# Patient Record
Sex: MALE | Race: WHITE | Employment: OTHER | URBAN - METROPOLITAN AREA
[De-identification: names, ages, dates, MRNs, and addresses within clinical notes are randomized per-mention and may not be internally consistent; named-entity substitution may affect disease eponyms.]

---

## 2017-07-07 ENCOUNTER — OFFICE VISIT (OUTPATIENT)
Dept: ORTHOPEDIC SURGERY | Age: 67
End: 2017-07-07

## 2017-07-07 VITALS — HEIGHT: 71 IN | BODY MASS INDEX: 32.19 KG/M2 | WEIGHT: 229.94 LBS

## 2017-07-07 DIAGNOSIS — M17.10 LOCALIZED OSTEOARTHROSIS, LOWER LEG: Chronic | ICD-10-CM

## 2017-07-07 DIAGNOSIS — M25.561 RIGHT KNEE PAIN, UNSPECIFIED CHRONICITY: Primary | ICD-10-CM

## 2017-07-07 DIAGNOSIS — M17.11 PRIMARY OSTEOARTHRITIS OF RIGHT KNEE: ICD-10-CM

## 2017-07-07 PROCEDURE — G8419 CALC BMI OUT NRM PARAM NOF/U: HCPCS | Performed by: ORTHOPAEDIC SURGERY

## 2017-07-07 PROCEDURE — 73564 X-RAY EXAM KNEE 4 OR MORE: CPT | Performed by: ORTHOPAEDIC SURGERY

## 2017-07-07 PROCEDURE — 3017F COLORECTAL CA SCREEN DOC REV: CPT | Performed by: ORTHOPAEDIC SURGERY

## 2017-07-07 PROCEDURE — 1123F ACP DISCUSS/DSCN MKR DOCD: CPT | Performed by: ORTHOPAEDIC SURGERY

## 2017-07-07 PROCEDURE — G8427 DOCREV CUR MEDS BY ELIG CLIN: HCPCS | Performed by: ORTHOPAEDIC SURGERY

## 2017-07-07 PROCEDURE — 4040F PNEUMOC VAC/ADMIN/RCVD: CPT | Performed by: ORTHOPAEDIC SURGERY

## 2017-07-07 PROCEDURE — MISCLOD MISC IP SERVICE NONBILLABLE: Performed by: ORTHOPAEDIC SURGERY

## 2017-07-07 PROCEDURE — 20610 DRAIN/INJ JOINT/BURSA W/O US: CPT | Performed by: ORTHOPAEDIC SURGERY

## 2017-07-07 PROCEDURE — 1036F TOBACCO NON-USER: CPT | Performed by: ORTHOPAEDIC SURGERY

## 2017-07-14 ENCOUNTER — OFFICE VISIT (OUTPATIENT)
Dept: ORTHOPEDIC SURGERY | Age: 67
End: 2017-07-14

## 2017-07-14 DIAGNOSIS — M17.11 PRIMARY OSTEOARTHRITIS OF RIGHT KNEE: Primary | Chronic | ICD-10-CM

## 2017-07-14 PROCEDURE — 20610 DRAIN/INJ JOINT/BURSA W/O US: CPT | Performed by: ORTHOPAEDIC SURGERY

## 2017-07-14 PROCEDURE — 1036F TOBACCO NON-USER: CPT | Performed by: ORTHOPAEDIC SURGERY

## 2017-07-19 ENCOUNTER — OFFICE VISIT (OUTPATIENT)
Dept: ORTHOPEDIC SURGERY | Age: 67
End: 2017-07-19

## 2017-07-19 DIAGNOSIS — M25.561 RIGHT KNEE PAIN, UNSPECIFIED CHRONICITY: ICD-10-CM

## 2017-07-19 DIAGNOSIS — M17.11 PRIMARY OSTEOARTHRITIS OF RIGHT KNEE: Primary | Chronic | ICD-10-CM

## 2017-07-19 PROCEDURE — 20610 DRAIN/INJ JOINT/BURSA W/O US: CPT | Performed by: FAMILY MEDICINE

## 2017-07-19 PROCEDURE — 1036F TOBACCO NON-USER: CPT | Performed by: FAMILY MEDICINE

## 2017-10-03 RX ORDER — MELOXICAM 15 MG/1
TABLET ORAL
Qty: 90 TABLET | Refills: 3 | Status: SHIPPED | OUTPATIENT
Start: 2017-10-03 | End: 2019-05-20 | Stop reason: ALTCHOICE

## 2018-04-02 ENCOUNTER — OFFICE VISIT (OUTPATIENT)
Dept: ORTHOPEDIC SURGERY | Age: 68
End: 2018-04-02

## 2018-04-02 VITALS
HEART RATE: 57 BPM | WEIGHT: 229.94 LBS | HEIGHT: 71 IN | SYSTOLIC BLOOD PRESSURE: 156 MMHG | DIASTOLIC BLOOD PRESSURE: 83 MMHG | BODY MASS INDEX: 32.19 KG/M2

## 2018-04-02 DIAGNOSIS — M17.0 PRIMARY OSTEOARTHRITIS OF BOTH KNEES: ICD-10-CM

## 2018-04-02 DIAGNOSIS — M17.11 PRIMARY OSTEOARTHRITIS OF RIGHT KNEE: ICD-10-CM

## 2018-04-02 DIAGNOSIS — M17.12 PRIMARY OSTEOARTHRITIS OF LEFT KNEE: ICD-10-CM

## 2018-04-02 DIAGNOSIS — R52 PAIN: Primary | ICD-10-CM

## 2018-04-02 PROCEDURE — 20610 DRAIN/INJ JOINT/BURSA W/O US: CPT | Performed by: ORTHOPAEDIC SURGERY

## 2018-04-02 RX ORDER — LOSARTAN POTASSIUM AND HYDROCHLOROTHIAZIDE 25; 100 MG/1; MG/1
TABLET ORAL
COMMUNITY
End: 2019-05-20

## 2018-04-09 ENCOUNTER — OFFICE VISIT (OUTPATIENT)
Dept: ORTHOPEDIC SURGERY | Age: 68
End: 2018-04-09

## 2018-04-09 VITALS — WEIGHT: 229.94 LBS | HEIGHT: 71 IN | BODY MASS INDEX: 32.19 KG/M2

## 2018-04-09 DIAGNOSIS — M17.12 PRIMARY OSTEOARTHRITIS OF LEFT KNEE: Chronic | ICD-10-CM

## 2018-04-09 DIAGNOSIS — M17.11 PRIMARY OSTEOARTHRITIS OF RIGHT KNEE: Primary | ICD-10-CM

## 2018-04-09 PROCEDURE — 20610 DRAIN/INJ JOINT/BURSA W/O US: CPT | Performed by: FAMILY MEDICINE

## 2018-04-09 RX ORDER — MELOXICAM 15 MG/1
15 TABLET ORAL DAILY
Qty: 30 TABLET | Refills: 3 | Status: SHIPPED | OUTPATIENT
Start: 2018-04-09 | End: 2018-04-09 | Stop reason: CLARIF

## 2018-04-09 RX ORDER — MELOXICAM 15 MG/1
15 TABLET ORAL DAILY
Qty: 90 TABLET | Refills: 0 | Status: SHIPPED | OUTPATIENT
Start: 2018-04-09 | End: 2019-05-20 | Stop reason: ALTCHOICE

## 2018-04-16 ENCOUNTER — OFFICE VISIT (OUTPATIENT)
Dept: ORTHOPEDIC SURGERY | Age: 68
End: 2018-04-16

## 2018-04-16 DIAGNOSIS — M25.561 RIGHT KNEE PAIN, UNSPECIFIED CHRONICITY: ICD-10-CM

## 2018-04-16 DIAGNOSIS — M17.11 PRIMARY OSTEOARTHRITIS OF RIGHT KNEE: ICD-10-CM

## 2018-04-16 DIAGNOSIS — M17.12 PRIMARY OSTEOARTHRITIS OF LEFT KNEE: Primary | Chronic | ICD-10-CM

## 2018-04-16 PROCEDURE — 20610 DRAIN/INJ JOINT/BURSA W/O US: CPT | Performed by: ORTHOPAEDIC SURGERY

## 2018-09-06 ENCOUNTER — TELEPHONE (OUTPATIENT)
Dept: ORTHOPEDIC SURGERY | Age: 68
End: 2018-09-06

## 2018-11-02 ENCOUNTER — OFFICE VISIT (OUTPATIENT)
Dept: ORTHOPEDIC SURGERY | Age: 68
End: 2018-11-02
Payer: MEDICARE

## 2018-11-02 DIAGNOSIS — M17.11 PRIMARY OSTEOARTHRITIS OF RIGHT KNEE: ICD-10-CM

## 2018-11-02 DIAGNOSIS — M17.12 PRIMARY OSTEOARTHRITIS OF LEFT KNEE: Primary | Chronic | ICD-10-CM

## 2018-11-02 PROCEDURE — 20610 DRAIN/INJ JOINT/BURSA W/O US: CPT | Performed by: ORTHOPAEDIC SURGERY

## 2018-11-02 RX ORDER — MELOXICAM 15 MG/1
15 TABLET ORAL DAILY
Qty: 90 TABLET | Refills: 0 | Status: SHIPPED | OUTPATIENT
Start: 2018-11-02 | End: 2019-04-22 | Stop reason: SDUPTHER

## 2018-11-07 ENCOUNTER — OFFICE VISIT (OUTPATIENT)
Dept: ORTHOPEDIC SURGERY | Age: 68
End: 2018-11-07
Payer: MEDICARE

## 2018-11-07 VITALS — BODY MASS INDEX: 32.19 KG/M2 | WEIGHT: 229.94 LBS | HEIGHT: 71 IN

## 2018-11-07 DIAGNOSIS — M17.11 PRIMARY OSTEOARTHRITIS OF RIGHT KNEE: Primary | ICD-10-CM

## 2018-11-07 DIAGNOSIS — M17.12 PRIMARY OSTEOARTHRITIS OF LEFT KNEE: Chronic | ICD-10-CM

## 2018-11-07 PROCEDURE — 99999 PR OFFICE/OUTPT VISIT,PROCEDURE ONLY: CPT | Performed by: FAMILY MEDICINE

## 2018-11-07 PROCEDURE — 20610 DRAIN/INJ JOINT/BURSA W/O US: CPT | Performed by: FAMILY MEDICINE

## 2018-11-12 ENCOUNTER — OFFICE VISIT (OUTPATIENT)
Dept: ORTHOPEDIC SURGERY | Age: 68
End: 2018-11-12
Payer: MEDICARE

## 2018-11-12 DIAGNOSIS — M17.12 PRIMARY OSTEOARTHRITIS OF LEFT KNEE: Primary | Chronic | ICD-10-CM

## 2018-11-12 DIAGNOSIS — M17.11 PRIMARY OSTEOARTHRITIS OF RIGHT KNEE: ICD-10-CM

## 2018-11-12 PROCEDURE — 20610 DRAIN/INJ JOINT/BURSA W/O US: CPT | Performed by: ORTHOPAEDIC SURGERY

## 2018-11-12 NOTE — PROGRESS NOTES
Product Euflexxa    [facility]#[id number]     Lot# [id number]    Exp 10/8/2019    Site CHAYO KNEE

## 2018-11-12 NOTE — PROGRESS NOTES
The patient returns today for their third Euflexxa injection to the left and right knees for diagnosis of osteoarthritis. . The risks, benefits, and complications of the injections were again discussed in detail with the patient. The risks discussed included but are not limited to infection, skin reactions, hot swollen joints, and anaphylaxis. The patient gave verbal informed consent for the injection. The patient skin was prepped with iodine and sterile 4x4 gauze pad and the left and right knee joints were injected with 2 ml of Euflexxa intra-articularly under sterile conditions  into the supra-lateral pouch. The patient tolerated the injection reasonably well. The patient was given instructions to ice the left and right knee and avoid strenuous activities for 24-48 hours. The patient was instructed to call the office immediately if there is increased pain, redness, warmth, fever, or chills. We will see the patient back in 6 months or as needed for follow up.

## 2019-04-22 DIAGNOSIS — M17.12 PRIMARY OSTEOARTHRITIS OF LEFT KNEE: Chronic | ICD-10-CM

## 2019-04-22 DIAGNOSIS — M17.11 PRIMARY OSTEOARTHRITIS OF RIGHT KNEE: ICD-10-CM

## 2019-04-24 RX ORDER — MELOXICAM 15 MG/1
TABLET ORAL
Qty: 90 TABLET | Refills: 0 | Status: SHIPPED | OUTPATIENT
Start: 2019-04-24 | End: 2019-07-31 | Stop reason: SDUPTHER

## 2019-05-20 ENCOUNTER — OFFICE VISIT (OUTPATIENT)
Dept: ORTHOPEDIC SURGERY | Age: 69
End: 2019-05-20
Payer: MEDICARE

## 2019-05-20 VITALS — BODY MASS INDEX: 32.19 KG/M2 | WEIGHT: 229.94 LBS | HEIGHT: 71 IN

## 2019-05-20 DIAGNOSIS — M17.0 BILATERAL PRIMARY OSTEOARTHRITIS OF KNEE: ICD-10-CM

## 2019-05-20 DIAGNOSIS — M17.11 PRIMARY OSTEOARTHRITIS OF RIGHT KNEE: Primary | ICD-10-CM

## 2019-05-20 DIAGNOSIS — M25.561 RIGHT KNEE PAIN, UNSPECIFIED CHRONICITY: ICD-10-CM

## 2019-05-20 PROCEDURE — 99213 OFFICE O/P EST LOW 20 MIN: CPT | Performed by: FAMILY MEDICINE

## 2019-05-20 PROCEDURE — 1123F ACP DISCUSS/DSCN MKR DOCD: CPT | Performed by: FAMILY MEDICINE

## 2019-05-20 PROCEDURE — G8427 DOCREV CUR MEDS BY ELIG CLIN: HCPCS | Performed by: FAMILY MEDICINE

## 2019-05-20 PROCEDURE — 4040F PNEUMOC VAC/ADMIN/RCVD: CPT | Performed by: FAMILY MEDICINE

## 2019-05-20 PROCEDURE — 1036F TOBACCO NON-USER: CPT | Performed by: FAMILY MEDICINE

## 2019-05-20 PROCEDURE — 3017F COLORECTAL CA SCREEN DOC REV: CPT | Performed by: FAMILY MEDICINE

## 2019-05-20 PROCEDURE — G8417 CALC BMI ABV UP PARAM F/U: HCPCS | Performed by: FAMILY MEDICINE

## 2019-05-20 PROCEDURE — 20610 DRAIN/INJ JOINT/BURSA W/O US: CPT | Performed by: FAMILY MEDICINE

## 2019-05-20 RX ORDER — HYALURONATE SODIUM 10 MG/ML
40 SYRINGE (ML) INTRAARTICULAR ONCE
Status: COMPLETED | OUTPATIENT
Start: 2019-05-20 | End: 2019-05-20

## 2019-05-20 RX ORDER — IRBESARTAN AND HYDROCHLOROTHIAZIDE 300; 12.5 MG/1; MG/1
1 TABLET, FILM COATED ORAL
COMMUNITY

## 2019-05-20 RX ADMIN — Medication 40 MG: at 08:39

## 2019-05-20 NOTE — PROGRESS NOTES
Chief Complaint  Knee Pain (EUFLEXXA #1 CHAYO KNEES)      Initial consultation right greater than left knee pain with known history of osteoarthritis    History of Present Illness:  Peggy Garcia is a 71 y.o. male is a very pleasant retired white male with known history of bilateral knee osteoarthritis who is a patient of Dr. Karis Joyaach was seen both myself and Dr. Becca Lawson in the past who is being seen today for recent worsening of his right knee osteoarthritis. He is to be done well with physical supplementation in the past and over the last few weeks has had increasing pain about his knee. He does continue to golf and there was no recalled history of actual injury or activity. He is very difficult performing his home-based exercises and does take his locks a cam 15 mg daily. He does utilize his brace occasionally on the right. Denies locking catching or instability symptoms. It's more of an achy pain at a 1-2/10 with distance walking or repetitive stair climbing. Denies locking catching or instability symptoms. He is not having night pain. He does have to take care of his wife who does have her own medical problems. He is very opposed to considering knee arthroplasty at this time and treat be told is not having a great deal of functional impairment. He is being seen today for repeat evaluation. Medical History     Patient's medications, allergies, past medical, surgical, social and family histories were reviewed and updated as appropriate. Review of Systems  Pertinent items are noted in HPI  Review of systems reviewed from Patient History Form dated on 5/20/2019 and available in the patient's chart under the Media tab. Vital Signs  There were no vitals filed for this visit. General Exam:     Constitutional: Patient is adequately groomed with no evidence of malnutrition  DTRs: Deep tendon reflexes are intact  Mental Status: The patient is oriented to time, place and person.  The patient's mood and affect are appropriate. Lymphatic: The lymphatic examination bilaterally reveals all areas to be without enlargement or induration. Vascular: Examination reveals no swelling or calf tenderness. Peripheral pulses are palpable and 2+. Neurological: The patient has good coordination. There is no weakness or sensory deficit. Knee Examination  Inspection:  There is no high-grade deformity. Does have some patellofemoral crepitation and trace knee joint effusions. Palpation:  He does have tenderness over the medial and lateral patellofemoral facet. He does have a mildly Positive grind test.  Some pain with medial joint line palpation but no high-grade meniscal clicks. Rang of Motion:  He is stiff with terminal 10° of extension with flexion to about 110 bilaterally. Hamstrings are mildly tight as is his right IT band. Strength:  4+ out of 5 with knee flexion and extension. Special Tests:  Mild pain with patellar grind testing. Mild discomfort with crepitation with medial Scooter's. No high-grade clicks. No high-grade instability. Hamstrings and IT band particularly on the right is tight. No palpable Baker's cyst or evidence of instability. Strength testing appears to relatively benign. Skin: There are no rashes, ulcerations or lesions. Distal neurovascular exam is intact. Gait: Fluid smooth gait    Reflex symmetrically preserved    Additional Comments:     Additional Examinations:  Right Lower Extremity: Examination of the right lower extremity does not show any tenderness, deformity or injury. Range of motion is unremarkable. There is no gross instability. There are no rashes, ulcerations or lesions. Strength and tone are normal.  Left Lower Extremity: Examination of the left lower extremity does not show any tenderness, deformity or injury. Range of motion is unremarkable. There is no gross instability. There are no rashes, ulcerations or lesions.   Strength and tone are normal.  Examination of the bilateral hip reveals intact skin. The patient demonstrates full painless range of motion with regards to flexion, abduction, internal and external rotation. There is not tenderness about the greater trochanter. There is a negative straight leg raise against resistance. Strength is 5/5 thorough out all planes. Diagnostic Test Findings:       Assessment :  #1. Symptomatic right greater than left knee multicompartment osteoarthritis with low-grade synovitis and bilateral knee Euflexxa No. 1    Impression:  Encounter Diagnoses   Name Primary?  Bilateral primary osteoarthritis of knee     Primary osteoarthritis of right knee Yes    Right knee pain, unspecified chronicity        Office Procedures:  Orders Placed This Encounter   Procedures    [place] ARTHROCENTESIS ASPIR&/INJ MAJOR JT/BURSA W/O US       Treatment Plan:  Treatment options were discussed with Eitan Osorio. We did review his plain films previously and his exam findings. He does have underlying knee osteoarthritis and has become a little bit more sore and achy over the last few weeks. After discussion of present cons of viscous supplementation, he did receive his first injection of Euflexxa to his knees bilaterally. This was performed using the standard refill 2 mL syringe. He'll continue with his home exercise program empirically some of his right knee brace as well as his meloxicam 15 mg daily. He will be seen back each of the next 2 weeks to finish up his Euflexxa series. He has done well with these in the past getting at least 6 months of pain relief between series. He will be seen back each the next 2 weeks and will contact us in the interim with questions or concerns. This dictation was performed with a verbal recognition program (DRAGON) and it was checked for errors. It is possible that there are still dictated errors within this office note.  If so, please bring any errors to my attention for an addendum. All efforts were made to ensure that this office note is accurate.

## 2019-05-29 ENCOUNTER — OFFICE VISIT (OUTPATIENT)
Dept: ORTHOPEDIC SURGERY | Age: 69
End: 2019-05-29
Payer: MEDICARE

## 2019-05-29 VITALS — HEIGHT: 71 IN | BODY MASS INDEX: 32.19 KG/M2 | WEIGHT: 229.94 LBS

## 2019-05-29 DIAGNOSIS — M17.0 BILATERAL PRIMARY OSTEOARTHRITIS OF KNEE: Primary | ICD-10-CM

## 2019-05-29 DIAGNOSIS — M25.561 RIGHT KNEE PAIN, UNSPECIFIED CHRONICITY: ICD-10-CM

## 2019-05-29 PROCEDURE — 20610 DRAIN/INJ JOINT/BURSA W/O US: CPT | Performed by: FAMILY MEDICINE

## 2019-05-29 PROCEDURE — 99999 PR OFFICE/OUTPT VISIT,PROCEDURE ONLY: CPT | Performed by: FAMILY MEDICINE

## 2019-05-29 RX ORDER — HYALURONATE SODIUM 10 MG/ML
40 SYRINGE (ML) INTRAARTICULAR ONCE
Status: COMPLETED | OUTPATIENT
Start: 2019-05-29 | End: 2019-05-29

## 2019-05-29 RX ADMIN — Medication 40 MG: at 11:02

## 2019-05-29 NOTE — PROGRESS NOTES
The patient returns today for their 2nd Euflexxa injection to his knees bilaterally for diagnosis of osteoarthritis. . The risks, benefits, and complications of the injections were again discussed in detail with the patient. The risks discussed included but are not limited to infection, skin reactions, hot swollen joints, and anaphylaxis. The patient gave verbal informed consent for the injection. The patient skin was prepped with iodine and sterile 4x4 gauze pad at the bilateral knee joint was injected with 2 ml of Euflexxa intra-articularly under sterile conditions  into the supra-lateral pouch. The patient tolerated the injection reasonably well. The patient was given instructions to ice the bilateral knee and avoid strenuous activities for 24-48 hours. The patient was instructed to call the office immediately if there is increased pain, redness, warmth, fever, or chills. The patient is aware that he can have repeat Visco supplementation in 6 months but he is been getting at least 1-2 years out of his Euflexxa injection series in the past.  He will be seen back in 1 week to finish up his Euflexxa injections bilaterally.   He is clinically doing reasonably well at this time and is eager to back into golfing and a regular basis

## 2019-06-03 ENCOUNTER — OFFICE VISIT (OUTPATIENT)
Dept: ORTHOPEDIC SURGERY | Age: 69
End: 2019-06-03
Payer: MEDICARE

## 2019-06-03 VITALS — BODY MASS INDEX: 32.19 KG/M2 | HEIGHT: 71 IN | WEIGHT: 229.94 LBS

## 2019-06-03 DIAGNOSIS — M17.0 BILATERAL PRIMARY OSTEOARTHRITIS OF KNEE: Primary | ICD-10-CM

## 2019-06-03 PROCEDURE — 20610 DRAIN/INJ JOINT/BURSA W/O US: CPT | Performed by: FAMILY MEDICINE

## 2019-06-03 RX ORDER — HYALURONATE SODIUM 10 MG/ML
40 SYRINGE (ML) INTRAARTICULAR ONCE
Status: COMPLETED | OUTPATIENT
Start: 2019-06-03 | End: 2019-06-03

## 2019-06-03 RX ADMIN — Medication 40 MG: at 09:02

## 2019-06-03 NOTE — PROGRESS NOTES
The patient returns today for their third Euflexxa injection to his knees bilaterally for diagnosis of osteoarthritis. . The risks, benefits, and complications of the injections were again discussed in detail with the patient. The risks discussed included but are not limited to infection, skin reactions, hot swollen joints, and anaphylaxis. The patient gave verbal informed consent for the injection. The patient skin was prepped with iodine and sterile 4x4 gauze pad at the bilateral knee joint was injected with 2 ml of Euflexxa intra-articularly under sterile conditions  into the supra-lateral pouch. The patient tolerated the injection reasonably well. The patient was given instructions to ice the bilateral knee and avoid strenuous activities for 24-48 hours. The patient was instructed to call the office immediately if there is increased pain, redness, warmth, fever, or chills. The patient is aware that he can have repeat Visco supplementation in 6 months but he is been getting at least 1-2 years out of his Euflexxa injection series in the past.  He is aware that he can have repeat Visco supplementation in 6 months. He is clinically doing reasonably well at this time and has been able to get back into golfing on a regular basis. We'll see him back in 6 months.

## 2019-07-31 DIAGNOSIS — M17.11 PRIMARY OSTEOARTHRITIS OF RIGHT KNEE: ICD-10-CM

## 2019-07-31 DIAGNOSIS — M17.12 PRIMARY OSTEOARTHRITIS OF LEFT KNEE: Chronic | ICD-10-CM

## 2019-07-31 RX ORDER — MELOXICAM 15 MG/1
TABLET ORAL
Qty: 90 TABLET | Refills: 0 | Status: SHIPPED | OUTPATIENT
Start: 2019-07-31 | End: 2020-05-18

## 2019-11-05 ENCOUNTER — TELEPHONE (OUTPATIENT)
Dept: ORTHOPEDIC SURGERY | Age: 69
End: 2019-11-05

## 2019-12-04 ENCOUNTER — OFFICE VISIT (OUTPATIENT)
Dept: ORTHOPEDIC SURGERY | Age: 69
End: 2019-12-04
Payer: MEDICARE

## 2019-12-04 VITALS — BODY MASS INDEX: 32.19 KG/M2 | HEIGHT: 71 IN | WEIGHT: 229.94 LBS

## 2019-12-04 DIAGNOSIS — M17.0 BILATERAL PRIMARY OSTEOARTHRITIS OF KNEE: Primary | ICD-10-CM

## 2019-12-04 DIAGNOSIS — G89.29 CHRONIC PAIN OF BOTH KNEES: ICD-10-CM

## 2019-12-04 DIAGNOSIS — M25.561 CHRONIC PAIN OF BOTH KNEES: ICD-10-CM

## 2019-12-04 DIAGNOSIS — M25.562 CHRONIC PAIN OF BOTH KNEES: ICD-10-CM

## 2019-12-04 PROCEDURE — 1036F TOBACCO NON-USER: CPT | Performed by: FAMILY MEDICINE

## 2019-12-04 PROCEDURE — G8427 DOCREV CUR MEDS BY ELIG CLIN: HCPCS | Performed by: FAMILY MEDICINE

## 2019-12-04 PROCEDURE — 3017F COLORECTAL CA SCREEN DOC REV: CPT | Performed by: FAMILY MEDICINE

## 2019-12-04 PROCEDURE — G8417 CALC BMI ABV UP PARAM F/U: HCPCS | Performed by: FAMILY MEDICINE

## 2019-12-04 PROCEDURE — 1123F ACP DISCUSS/DSCN MKR DOCD: CPT | Performed by: FAMILY MEDICINE

## 2019-12-04 PROCEDURE — G8484 FLU IMMUNIZE NO ADMIN: HCPCS | Performed by: FAMILY MEDICINE

## 2019-12-04 PROCEDURE — 99213 OFFICE O/P EST LOW 20 MIN: CPT | Performed by: FAMILY MEDICINE

## 2019-12-04 PROCEDURE — 4040F PNEUMOC VAC/ADMIN/RCVD: CPT | Performed by: FAMILY MEDICINE

## 2019-12-04 RX ORDER — METHYLPREDNISOLONE 4 MG/1
TABLET ORAL
Qty: 21 KIT | Refills: 0 | Status: SHIPPED | OUTPATIENT
Start: 2019-12-04 | End: 2019-12-04 | Stop reason: CLARIF

## 2019-12-04 RX ORDER — METHYLPREDNISOLONE 4 MG/1
TABLET ORAL
Qty: 21 KIT | Refills: 0 | Status: SHIPPED | OUTPATIENT
Start: 2019-12-04 | End: 2020-04-27 | Stop reason: ALTCHOICE

## 2019-12-04 RX ORDER — MELOXICAM 15 MG/1
15 TABLET ORAL DAILY
Qty: 30 TABLET | Refills: 3 | Status: SHIPPED | OUTPATIENT
Start: 2019-12-04 | End: 2019-12-09

## 2019-12-09 ENCOUNTER — OFFICE VISIT (OUTPATIENT)
Dept: ORTHOPEDIC SURGERY | Age: 69
End: 2019-12-09
Payer: MEDICARE

## 2019-12-09 VITALS — BODY MASS INDEX: 30.8 KG/M2 | WEIGHT: 220 LBS | HEIGHT: 71 IN

## 2019-12-09 DIAGNOSIS — M17.11 PRIMARY OSTEOARTHRITIS OF RIGHT KNEE: Primary | ICD-10-CM

## 2019-12-09 DIAGNOSIS — M17.12 PRIMARY OSTEOARTHRITIS OF LEFT KNEE: ICD-10-CM

## 2019-12-09 PROCEDURE — 4040F PNEUMOC VAC/ADMIN/RCVD: CPT | Performed by: ORTHOPAEDIC SURGERY

## 2019-12-09 PROCEDURE — 1123F ACP DISCUSS/DSCN MKR DOCD: CPT | Performed by: ORTHOPAEDIC SURGERY

## 2019-12-09 PROCEDURE — 3017F COLORECTAL CA SCREEN DOC REV: CPT | Performed by: ORTHOPAEDIC SURGERY

## 2019-12-09 PROCEDURE — G8417 CALC BMI ABV UP PARAM F/U: HCPCS | Performed by: ORTHOPAEDIC SURGERY

## 2019-12-09 PROCEDURE — 99213 OFFICE O/P EST LOW 20 MIN: CPT | Performed by: ORTHOPAEDIC SURGERY

## 2019-12-09 PROCEDURE — G8484 FLU IMMUNIZE NO ADMIN: HCPCS | Performed by: ORTHOPAEDIC SURGERY

## 2019-12-09 PROCEDURE — G8427 DOCREV CUR MEDS BY ELIG CLIN: HCPCS | Performed by: ORTHOPAEDIC SURGERY

## 2019-12-09 PROCEDURE — 1036F TOBACCO NON-USER: CPT | Performed by: ORTHOPAEDIC SURGERY

## 2019-12-09 PROCEDURE — L3170 FOOT PLAS HEEL STABI PRE OTS: HCPCS | Performed by: ORTHOPAEDIC SURGERY

## 2019-12-09 RX ORDER — HYALURONATE SODIUM 10 MG/ML
40 SYRINGE (ML) INTRAARTICULAR ONCE
Status: COMPLETED | OUTPATIENT
Start: 2019-12-09 | End: 2019-12-09

## 2019-12-09 RX ADMIN — Medication 40 MG: at 11:03

## 2019-12-18 ENCOUNTER — OFFICE VISIT (OUTPATIENT)
Dept: ORTHOPEDIC SURGERY | Age: 69
End: 2019-12-18
Payer: MEDICARE

## 2019-12-18 VITALS — HEIGHT: 71 IN | BODY MASS INDEX: 30.8 KG/M2 | WEIGHT: 220.02 LBS

## 2019-12-18 DIAGNOSIS — M17.11 PRIMARY OSTEOARTHRITIS OF RIGHT KNEE: Primary | ICD-10-CM

## 2019-12-18 DIAGNOSIS — M25.562 CHRONIC PAIN OF BOTH KNEES: ICD-10-CM

## 2019-12-18 DIAGNOSIS — G89.29 CHRONIC PAIN OF BOTH KNEES: ICD-10-CM

## 2019-12-18 DIAGNOSIS — M25.561 CHRONIC PAIN OF BOTH KNEES: ICD-10-CM

## 2019-12-18 DIAGNOSIS — M17.12 PRIMARY OSTEOARTHRITIS OF LEFT KNEE: ICD-10-CM

## 2019-12-18 PROCEDURE — 20610 DRAIN/INJ JOINT/BURSA W/O US: CPT | Performed by: FAMILY MEDICINE

## 2019-12-18 RX ORDER — HYALURONATE SODIUM 10 MG/ML
40 SYRINGE (ML) INTRAARTICULAR ONCE
Status: COMPLETED | OUTPATIENT
Start: 2019-12-18 | End: 2019-12-18

## 2019-12-18 RX ADMIN — Medication 40 MG: at 08:31

## 2019-12-23 ENCOUNTER — OFFICE VISIT (OUTPATIENT)
Dept: ORTHOPEDIC SURGERY | Age: 69
End: 2019-12-23
Payer: MEDICARE

## 2019-12-23 VITALS — HEIGHT: 71 IN | WEIGHT: 220.02 LBS | BODY MASS INDEX: 30.8 KG/M2

## 2019-12-23 DIAGNOSIS — M17.0 BILATERAL PRIMARY OSTEOARTHRITIS OF KNEE: Primary | ICD-10-CM

## 2019-12-23 DIAGNOSIS — M25.562 CHRONIC PAIN OF BOTH KNEES: ICD-10-CM

## 2019-12-23 DIAGNOSIS — G89.29 CHRONIC PAIN OF BOTH KNEES: ICD-10-CM

## 2019-12-23 DIAGNOSIS — M25.561 CHRONIC PAIN OF BOTH KNEES: ICD-10-CM

## 2019-12-23 PROCEDURE — 20610 DRAIN/INJ JOINT/BURSA W/O US: CPT | Performed by: FAMILY MEDICINE

## 2019-12-23 RX ORDER — HYALURONATE SODIUM 10 MG/ML
40 SYRINGE (ML) INTRAARTICULAR ONCE
Status: COMPLETED | OUTPATIENT
Start: 2019-12-23 | End: 2019-12-23

## 2019-12-23 RX ADMIN — Medication 40 MG: at 11:20

## 2020-03-02 RX ORDER — MELOXICAM 15 MG/1
15 TABLET ORAL DAILY
Qty: 90 TABLET | Refills: 1 | Status: SHIPPED | OUTPATIENT
Start: 2020-03-02 | End: 2020-04-27 | Stop reason: SDUPTHER

## 2020-04-27 ENCOUNTER — OFFICE VISIT (OUTPATIENT)
Dept: ORTHOPEDIC SURGERY | Age: 70
End: 2020-04-27
Payer: MEDICARE

## 2020-04-27 VITALS — WEIGHT: 220.02 LBS | BODY MASS INDEX: 30.8 KG/M2 | HEIGHT: 71 IN

## 2020-04-27 PROCEDURE — 20610 DRAIN/INJ JOINT/BURSA W/O US: CPT | Performed by: FAMILY MEDICINE

## 2020-04-27 PROCEDURE — G8427 DOCREV CUR MEDS BY ELIG CLIN: HCPCS | Performed by: FAMILY MEDICINE

## 2020-04-27 PROCEDURE — 99214 OFFICE O/P EST MOD 30 MIN: CPT | Performed by: FAMILY MEDICINE

## 2020-04-27 PROCEDURE — 1123F ACP DISCUSS/DSCN MKR DOCD: CPT | Performed by: FAMILY MEDICINE

## 2020-04-27 PROCEDURE — G8417 CALC BMI ABV UP PARAM F/U: HCPCS | Performed by: FAMILY MEDICINE

## 2020-04-27 PROCEDURE — 1036F TOBACCO NON-USER: CPT | Performed by: FAMILY MEDICINE

## 2020-04-27 PROCEDURE — 4040F PNEUMOC VAC/ADMIN/RCVD: CPT | Performed by: FAMILY MEDICINE

## 2020-04-27 PROCEDURE — 3017F COLORECTAL CA SCREEN DOC REV: CPT | Performed by: FAMILY MEDICINE

## 2020-04-27 RX ORDER — BETAMETHASONE SODIUM PHOSPHATE AND BETAMETHASONE ACETATE 3; 3 MG/ML; MG/ML
12 INJECTION, SUSPENSION INTRA-ARTICULAR; INTRALESIONAL; INTRAMUSCULAR; SOFT TISSUE ONCE
Status: COMPLETED | OUTPATIENT
Start: 2020-04-27 | End: 2020-04-27

## 2020-04-27 RX ORDER — AMLODIPINE BESYLATE AND ATORVASTATIN CALCIUM 10; 10 MG/1; MG/1
1 TABLET, FILM COATED ORAL DAILY
COMMUNITY

## 2020-04-27 RX ORDER — LIDOCAINE HYDROCHLORIDE 10 MG/ML
1 INJECTION, SOLUTION INFILTRATION; PERINEURAL ONCE
Status: COMPLETED | OUTPATIENT
Start: 2020-04-27 | End: 2020-04-27

## 2020-04-27 RX ORDER — BUPIVACAINE HYDROCHLORIDE 2.5 MG/ML
2 INJECTION, SOLUTION INFILTRATION; PERINEURAL ONCE
Status: COMPLETED | OUTPATIENT
Start: 2020-04-27 | End: 2020-04-27

## 2020-04-27 RX ADMIN — BETAMETHASONE SODIUM PHOSPHATE AND BETAMETHASONE ACETATE 12 MG: 3; 3 INJECTION, SUSPENSION INTRA-ARTICULAR; INTRALESIONAL; INTRAMUSCULAR; SOFT TISSUE at 09:04

## 2020-04-27 RX ADMIN — LIDOCAINE HYDROCHLORIDE 1 ML: 10 INJECTION, SOLUTION INFILTRATION; PERINEURAL at 09:05

## 2020-04-27 RX ADMIN — BUPIVACAINE HYDROCHLORIDE 5 MG: 2.5 INJECTION, SOLUTION INFILTRATION; PERINEURAL at 09:05

## 2020-05-18 RX ORDER — MELOXICAM 15 MG/1
TABLET ORAL
Qty: 30 TABLET | Refills: 0 | Status: SHIPPED | OUTPATIENT
Start: 2020-05-18 | End: 2020-07-20

## 2020-06-29 ENCOUNTER — OFFICE VISIT (OUTPATIENT)
Dept: ORTHOPEDIC SURGERY | Age: 70
End: 2020-06-29
Payer: MEDICARE

## 2020-06-29 VITALS — BODY MASS INDEX: 31.5 KG/M2 | HEIGHT: 70 IN | WEIGHT: 220 LBS

## 2020-06-29 PROCEDURE — G8427 DOCREV CUR MEDS BY ELIG CLIN: HCPCS | Performed by: FAMILY MEDICINE

## 2020-06-29 PROCEDURE — 3017F COLORECTAL CA SCREEN DOC REV: CPT | Performed by: FAMILY MEDICINE

## 2020-06-29 PROCEDURE — 20610 DRAIN/INJ JOINT/BURSA W/O US: CPT | Performed by: FAMILY MEDICINE

## 2020-06-29 PROCEDURE — 1036F TOBACCO NON-USER: CPT | Performed by: FAMILY MEDICINE

## 2020-06-29 PROCEDURE — 4040F PNEUMOC VAC/ADMIN/RCVD: CPT | Performed by: FAMILY MEDICINE

## 2020-06-29 PROCEDURE — 1123F ACP DISCUSS/DSCN MKR DOCD: CPT | Performed by: FAMILY MEDICINE

## 2020-06-29 PROCEDURE — G8417 CALC BMI ABV UP PARAM F/U: HCPCS | Performed by: FAMILY MEDICINE

## 2020-06-29 RX ORDER — ASPIRIN 81 MG/1
81 TABLET ORAL DAILY
COMMUNITY

## 2020-06-29 RX ORDER — METOPROLOL SUCCINATE AND HYDROCHLOROTHIAZIDE 12.5; 1 MG/1; MG/1
TABLET ORAL
COMMUNITY

## 2020-06-29 RX ORDER — HYALURONATE SODIUM 10 MG/ML
40 SYRINGE (ML) INTRAARTICULAR ONCE
Status: COMPLETED | OUTPATIENT
Start: 2020-06-29 | End: 2020-06-29

## 2020-06-29 RX ADMIN — Medication 40 MG: at 09:00

## 2020-06-29 NOTE — PROGRESS NOTES
is helped him out substantially in the past.  He does not want to consider knee arthroplasty during golf season but may consider this during the winter. Denies locking catching or true instability symptoms. He does utilize his knee braces consistently with golfing as tolerated his meloxicam.  Is locking catching or instability symptoms. Denies rest or night pain. Less pain with stair climbing. He does golf several times per week and but usually uses a cart. Attest: I have reviewed and attest the documentation of the HPI documented by my . I will make any changes if necessary. Enc Date: 6/29/2020  Time: 9:13 AM  Provider: Timbo Dunham MD        Social History     Tobacco Use    Smoking status: Never Smoker    Smokeless tobacco: Never Used   Substance Use Topics    Alcohol use: Not on file    Drug use: Not on file        Review of Systems  Pertinent items are noted in HPI  Review of systems reviewed from Patient History Form dated on 12/4/2019 and available in the patient's chart under the Media tab. Vital Signs     Ht 5' 10\" (1.778 m)   Wt 220 lb (99.8 kg)   BMI 31.57 kg/m²       General Exam:   Constitutional: Patient is adequately groomed with no evidence of malnutrition  DTRs: Deep tendon reflexes are intact  Mental Status: The patient is oriented to time, place and person. The patient's mood and affect are appropriate. Lymphatic: The lymphatic examination bilaterally reveals all areas to be without enlargement or induration. Vascular: Examination reveals no swelling or calf tenderness. Peripheral pulses are palpable and 2+. Neurological: The patient has good coordination. There is no weakness or sensory deficit. Knee Examination  Inspection:  There is no high-grade deformity. Does have some patellofemoral crepitation and trace knee joint effusions.     Palpation:  He does have more mild tenderness over the medial and lateral patellofemoral facet.   He does have a mildly Positive grind test.  Some pain with medial joint line palpation but no high-grade meniscal clicks.     Rang of Motion:  He is stiff with terminal 10° of extension with flexion to about 110 bilaterally. Hamstrings are mildly tight as is his right IT band.     Strength:  4+ out of 5 with knee flexion and extension.     Special Tests:  Mild pain with patellar grind testing. Mild discomfort with crepitation with medial Scooter's. No high-grade clicks. No high-grade instability. Hamstrings and IT band particularly on the right is tight. No palpable Baker's cyst or evidence of instability. Strength testing appears to relatively benign.     Skin: There are no rashes, ulcerations or lesions. Distal neurovascular exam is intact.     Gait: Fluid smooth gait     Reflex symmetrically preserved     Additional Comments:      Additional Examinations:  Right Lower Extremity: Examination of the right lower extremity does not show any tenderness, deformity or injury. Range of motion is unremarkable. There is no gross instability. There are no rashes, ulcerations or lesions. Strength and tone are normal.  Left Lower Extremity: Examination of the left lower extremity does not show any tenderness, deformity or injury. Range of motion is unremarkable. There is no gross instability. There are no rashes, ulcerations or lesions. Strength and tone are normal.  Examination of the bilateral hip reveals intact skin. The patient demonstrates full painless range of motion with regards to flexion, abduction, internal and external rotation. There is not tenderness about the greater trochanter. There is a negative straight leg raise against resistance.   Strength is 5/5 thorough out all planes.        Diagnostic Test Findings: Updated bilateral knee AP and PA weightbearing sunrise and lateral films were reviewed from 12/4/2019 and does show advanced bilateral knee medial compartment osteoarthritis with effective were made to ensure that this office note is accurate.

## 2020-07-06 ENCOUNTER — OFFICE VISIT (OUTPATIENT)
Dept: ORTHOPEDIC SURGERY | Age: 70
End: 2020-07-06
Payer: MEDICARE

## 2020-07-06 VITALS — HEIGHT: 70 IN | BODY MASS INDEX: 31.5 KG/M2 | WEIGHT: 220 LBS

## 2020-07-06 PROCEDURE — 20610 DRAIN/INJ JOINT/BURSA W/O US: CPT | Performed by: FAMILY MEDICINE

## 2020-07-06 RX ORDER — HYALURONATE SODIUM 10 MG/ML
40 SYRINGE (ML) INTRAARTICULAR ONCE
Status: COMPLETED | OUTPATIENT
Start: 2020-07-06 | End: 2020-07-06

## 2020-07-06 RX ADMIN — Medication 40 MG: at 08:56

## 2020-07-06 NOTE — PROGRESS NOTES
Spencer Concepcion is a 71 y.o. male who is a very pleasant retired white male who does golf on a near daily basis and is a patient of Dr. Devendra Mckeon who had been followed over the last several years by myself and Dr. 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 Did have consultation with Dr. Estuardo oRe regarding options down the road for his knee. The bottom line is he is still functioning and pain is tolerable. He did learn that he may be a candidate for partial replacement down the road if conservative treatment is failing him. They did give him his first Euflexxa injection during his visit on 12/9/2019 and is here today for his second injection. The patient returns today for their 2nd Euflexxa injection to his knees bilaterally for diagnosis of osteoarthritis. . The risks, benefits, and complications of the injections were again discussed in detail with the patient. The risks discussed included but are not limited to infection, skin reactions, hot swollen joints, and anaphylaxis. The patient gave verbal informed consent for the injection. The patient skin was prepped with iodine and sterile 4x4 gauze pad at the bilateral knee joint was injected with 2 ml of Euflexxa intra-articularly under sterile conditions  into the supra-lateral pouch. The patient tolerated the injection reasonably well. The patient was given instructions to ice the bilateral knee and avoid strenuous activities for 24-48 hours. The patient was instructed to call the office immediately if there is increased pain, redness, warmth, fever, or chills. The patient is aware that he can have repeat Visco supplementation in 6 months but he is been getting at least 1-2 years out of his Euflexxa injection series in the past.  He will be seen back in 1 week to finish up his Euflexxa injections bilaterally.   He is clinically doing reasonably well at this time and is eager to back into [employer] and a regular basis    Continue with his home-based exercise program use of his knee braces and his meloxicam 15 mg daily. Will be seen back next week to continue with his Euflexxa injection.

## 2020-07-13 ENCOUNTER — OFFICE VISIT (OUTPATIENT)
Dept: ORTHOPEDIC SURGERY | Age: 70
End: 2020-07-13
Payer: MEDICARE

## 2020-07-13 VITALS — WEIGHT: 220 LBS | HEIGHT: 70 IN | BODY MASS INDEX: 31.5 KG/M2

## 2020-07-13 PROCEDURE — 20610 DRAIN/INJ JOINT/BURSA W/O US: CPT | Performed by: FAMILY MEDICINE

## 2020-07-13 RX ORDER — HYALURONATE SODIUM 10 MG/ML
40 SYRINGE (ML) INTRAARTICULAR ONCE
Status: COMPLETED | OUTPATIENT
Start: 2020-07-13 | End: 2020-07-13

## 2020-07-13 RX ADMIN — Medication 40 MG: at 09:00

## 2020-07-13 NOTE — PROGRESS NOTES
David Cheng is a 79 y.o. male who is a very pleasant retired white male who does golf on a near daily basis and is a patient of Dr. Wm Jerez who had been followed over the last several years by myself and Dr. 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 Did have consultation with Dr. Alvaro Hayden regarding options down the road for his knee. The bottom line is he is still functioning and pain is tolerable. He did learn that he may be a candidate for partial replacement down the road if conservative treatment is failing him. He is here today for his third injection. The patient returns today for their third Euflexxa injection to his knees bilaterally for diagnosis of osteoarthritis. . The risks, benefits, and complications of the injections were again discussed in detail with the patient. The risks discussed included but are not limited to infection, skin reactions, hot swollen joints, and anaphylaxis. The patient gave verbal informed consent for the injection. The patient skin was prepped with iodine and sterile 4x4 gauze pad at the bilateral knee joint was injected with 2 ml of Euflexxa intra-articularly under sterile conditions  into the supra-lateral pouch. The patient tolerated the injection reasonably well. The patient was given instructions to ice the bilateral knee and avoid strenuous activities for 24-48 hours. The patient was instructed to call the office immediately if there is increased pain, redness, warmth, fever, or chills. The patient is aware that he can have repeat Visco supplementation in 6 months but he is been getting at least 6-12 out of his Euflexxa injection series in the past.  He will call us in the interim with questions or concerns.    He is clinically doing reasonably well at this time and is eager to back into golfing and a regular

## 2020-07-20 RX ORDER — MELOXICAM 15 MG/1
TABLET ORAL
Qty: 90 TABLET | Refills: 0 | Status: SHIPPED | OUTPATIENT
Start: 2020-07-20 | End: 2020-10-14

## 2020-10-14 RX ORDER — MELOXICAM 15 MG/1
TABLET ORAL
Qty: 90 TABLET | Refills: 0 | Status: SHIPPED | OUTPATIENT
Start: 2020-10-14

## 2021-01-18 ENCOUNTER — OFFICE VISIT (OUTPATIENT)
Dept: ORTHOPEDIC SURGERY | Age: 71
End: 2021-01-18
Payer: MEDICARE

## 2021-01-18 VITALS — BODY MASS INDEX: 31.5 KG/M2 | WEIGHT: 220 LBS | HEIGHT: 70 IN

## 2021-01-18 DIAGNOSIS — M17.11 PRIMARY OSTEOARTHRITIS OF RIGHT KNEE: ICD-10-CM

## 2021-01-18 DIAGNOSIS — M17.12 PRIMARY OSTEOARTHRITIS OF LEFT KNEE: Primary | ICD-10-CM

## 2021-01-18 DIAGNOSIS — M25.561 CHRONIC PAIN OF BOTH KNEES: ICD-10-CM

## 2021-01-18 DIAGNOSIS — G89.29 CHRONIC PAIN OF BOTH KNEES: ICD-10-CM

## 2021-01-18 DIAGNOSIS — M25.562 CHRONIC PAIN OF BOTH KNEES: ICD-10-CM

## 2021-01-18 PROCEDURE — 20610 DRAIN/INJ JOINT/BURSA W/O US: CPT | Performed by: FAMILY MEDICINE

## 2021-01-18 PROCEDURE — 4040F PNEUMOC VAC/ADMIN/RCVD: CPT | Performed by: FAMILY MEDICINE

## 2021-01-18 PROCEDURE — 3017F COLORECTAL CA SCREEN DOC REV: CPT | Performed by: FAMILY MEDICINE

## 2021-01-18 PROCEDURE — G8427 DOCREV CUR MEDS BY ELIG CLIN: HCPCS | Performed by: FAMILY MEDICINE

## 2021-01-18 PROCEDURE — 99214 OFFICE O/P EST MOD 30 MIN: CPT | Performed by: FAMILY MEDICINE

## 2021-01-18 PROCEDURE — G8417 CALC BMI ABV UP PARAM F/U: HCPCS | Performed by: FAMILY MEDICINE

## 2021-01-18 PROCEDURE — 1036F TOBACCO NON-USER: CPT | Performed by: FAMILY MEDICINE

## 2021-01-18 PROCEDURE — 1123F ACP DISCUSS/DSCN MKR DOCD: CPT | Performed by: FAMILY MEDICINE

## 2021-01-18 PROCEDURE — G8484 FLU IMMUNIZE NO ADMIN: HCPCS | Performed by: FAMILY MEDICINE

## 2021-01-18 RX ORDER — HYALURONATE SODIUM 10 MG/ML
40 SYRINGE (ML) INTRAARTICULAR ONCE
Status: COMPLETED | OUTPATIENT
Start: 2021-01-18 | End: 2021-01-18

## 2021-01-18 RX ADMIN — Medication 40 MG: at 11:05

## 2021-01-18 NOTE — PROGRESS NOTES
Chief Complaint  Knee Pain (fu jose alberto knee, wanting to start Euflexxa today)      Rigoberto Castellanos is a 79 y.o. male who is a very pleasant retired white male who does golf on a near daily basis and is a patient of Dr. Jaime Sullivan who had been followed over the last several years by myself and Dr. Bharti Melvin for underlying significant medial compartment osteoarthritis with patellofemoral arthropathy who is being seen back today for recurrence of his knee pain. He last completed a round of bilateral knee Euflexxa on 12/23/2019 which did help him substantially up until early April 2020 there is no interim history of injury but has been golfing 4 to 5 days/week but it is not raining. Ulus Reusing He does relate that while this is helped him in the past his last round of Visco supplementation is not nearly as effective as previous rounds have been. He is continue with his exercise program and does takes his his meloxicam fairly consistently. He does use knee sleeves which he got over-the-counter with golfing but typically when he golfs will come home and have to ice his knees. He is able to walk and perform most of his normal activities but typically will be sore post activity. Distance walking walking on uneven surfaces and stairclimbing can produce pain anywhere from a 2-5 out of 10. It is more achy in nature. He has been strongly considering the possibility of knee arthroplasty but is not having substantial night pain. His concern is that he is not getting \"any younger\" and does wish to continue with his normal golfing patterns. Denies locking catching or instability symptoms. He does have crepitation and popping. He does have episodic swelling. No true instability symptoms noted. He was last seen in the office on 4/27/2020 and was having significant discomfort in his knees bilaterally at that point.   With treatment he presents back today stating that he is doing much better would like to consider Visco supplementation once again which is helped him out substantially in the past.  He does not want to consider knee arthroplasty during golf season but may consider this during the winter. Denies locking catching or true instability symptoms. He does utilize his knee braces consistently with golfing as tolerated his meloxicam.  Is locking catching or instability symptoms. Denies rest or night pain. Less pain with stair climbing. He does golf several times per week and but usually uses a cart. Oren Tolliver was last seen in the office on 7/13/2020 and was continued on conservative treatment for his bilateral knee osteoarthritis. We completed Euflexxa to his knees bilaterally on 7/13/2020 and was continued on conservative treatment with his home-based exercise program and use of his knee brace. He did very well with viscosupplementation and continues to golf even in the winter several times per week. He has tolerated his meloxicam and does believe he had gotten benefit from receiving his viscosupplementation. He would like to try this again as he is hopeful to put off surgical intervention as much as possible. At most with his pain is only 1-2 out of 10 with distance walking and stair climbing. No night pain mechanical or instability symptoms noted. Pain Assessment  Location of Pain: Knee  Location Modifiers: Left, Right  Severity of Pain: 0     Attest: I have reviewed and attest the documentation of the HPI documented by my . I will make any changes if necessary. Enc Date: 1/18/2021  Time: 12:42 PM  Provider: José Luis Maldonado MD        Social History     Tobacco Use    Smoking status: Never Smoker    Smokeless tobacco: Never Used   Substance Use Topics    Alcohol use: Not on file    Drug use: Not on file        Review of Systems  Pertinent items are noted in HPI  Review of systems reviewed from Patient History Form dated on 12/4/2019 and available in the patient's chart under the Media tab.        Vital Signs     Ht 5' 10\" (1.778 m)   Wt 220 lb (99.8 kg)   BMI 31.57 kg/m²       General Exam:   Constitutional: Patient is adequately groomed with no evidence of malnutrition  DTRs: Deep tendon reflexes are intact  Mental Status: The patient is oriented to time, place and person. The patient's mood and affect are appropriate. Lymphatic: The lymphatic examination bilaterally reveals all areas to be without enlargement or induration. Vascular: Examination reveals no swelling or calf tenderness. Peripheral pulses are palpable and 2+. Neurological: The patient has good coordination. There is no weakness or sensory deficit. Knee Examination  Inspection:  There is no high-grade deformity. Does have some patellofemoral crepitation and trace knee joint effusions.     Palpation:  He does have more mild tenderness over the medial and lateral patellofemoral facet. He does have a mildly Positive grind test.  Some pain with medial joint line palpation but no high-grade meniscal clicks.     Rang of Motion:  He is stiff with terminal 10° of extension with flexion to about 110 bilaterally. Hamstrings are mildly tight as is his right IT band.     Strength:  4+ out of 5 with knee flexion and extension.     Special Tests:  Mild pain with patellar grind testing. Mild discomfort with crepitation with medial Scooter's. No high-grade clicks. No high-grade instability. Hamstrings and IT band particularly on the right is tight. No palpable Baker's cyst or evidence of instability. Strength testing appears to relatively benign.     Skin: There are no rashes, ulcerations or lesions. Distal neurovascular exam is intact.     Gait: Fluid smooth gait     Reflex symmetrically preserved     Additional Comments:      Additional Examinations:  Right Lower Extremity: Examination of the right lower extremity does not show any tenderness, deformity or injury. Range of motion is unremarkable. There is no gross instability.   There are no rashes, ulcerations or lesions. Strength and tone are normal.  Left Lower Extremity: Examination of the left lower extremity does not show any tenderness, deformity or injury. Range of motion is unremarkable. There is no gross instability. There are no rashes, ulcerations or lesions. Strength and tone are normal.  Examination of the bilateral hip reveals intact skin. The patient demonstrates full painless range of motion with regards to flexion, abduction, internal and external rotation. There is not tenderness about the greater trochanter. There is a negative straight leg raise against resistance. Strength is 5/5 thorough out all planes.        Diagnostic Test Findings: Updated bilateral knee AP and PA weightbearing sunrise and lateral films were reviewed from 12/4/2019 and does show advanced bilateral knee medial compartment osteoarthritis with effective bone-on-bone changes to the medial compartments. At least moderate right greater than left patellofemoral arthropathy with spurring was noted. Assessment & Plan:    Encounter Diagnoses   Name Primary?  Primary osteoarthritis of left knee Yes    Primary osteoarthritis of right knee     Chronic pain of both knees        Orders Placed This Encounter   Procedures    [place] ARTHROCENTESIS ASPIR&/INJ MAJOR JT/BURSA W/O US         Treatment Plan:  Treatment options were discussed with Rere Guadalupe. We did review his updated plain films and his exam findings. He does have underlying significant medial compartment bilateral knee osteoarthritis and clinically at this time is doing reasonably well. Most of the time his pain is 0 out of 10 but at maximum he can only be a 1-2 out of 10 but he has been able to golf several days per week. After discussion of pros and cons of Visco supplementation, he did receive his first injection of Euflexxa to his knees bilaterally. This was performed using the standard prefilled 2 cc syringe.   He will be seen back each the next 2 weeks to continue with his Euflexxa series which is helped him substantially in the past.  He did have surgical consultation in December 2019 with Dr. Stefanie Lorenzo who recommended that he continues with conservative treatment given his lack of high-grade limitations currently and lack of night pain and reasonable functionality. He may continue with his knee braces and his home-based exercise program as well as his meloxicam 15 mg daily. He will be seen back each in the next 2 weeks to continue with his Euflexxa series to his knees bilaterally. He will contact us in the interim with questions or concerns.          This dictation was performed with a verbal recognition program (DRAGON) and it was checked for errors. It is possible that there are still dictated errors within this office note. If so, please bring any errors to my attention for an addendum. All efforts were made to ensure that this office note is accurate.

## 2021-01-25 ENCOUNTER — OFFICE VISIT (OUTPATIENT)
Dept: ORTHOPEDIC SURGERY | Age: 71
End: 2021-01-25
Payer: MEDICARE

## 2021-01-25 VITALS — HEIGHT: 70 IN | WEIGHT: 220 LBS | BODY MASS INDEX: 31.5 KG/M2

## 2021-01-25 DIAGNOSIS — M25.562 CHRONIC PAIN OF BOTH KNEES: ICD-10-CM

## 2021-01-25 DIAGNOSIS — M25.561 CHRONIC PAIN OF BOTH KNEES: ICD-10-CM

## 2021-01-25 DIAGNOSIS — M17.11 PRIMARY OSTEOARTHRITIS OF RIGHT KNEE: ICD-10-CM

## 2021-01-25 DIAGNOSIS — M17.12 PRIMARY OSTEOARTHRITIS OF LEFT KNEE: Primary | ICD-10-CM

## 2021-01-25 DIAGNOSIS — G89.29 CHRONIC PAIN OF BOTH KNEES: ICD-10-CM

## 2021-01-25 PROCEDURE — 20610 DRAIN/INJ JOINT/BURSA W/O US: CPT | Performed by: FAMILY MEDICINE

## 2021-01-25 RX ORDER — HYALURONATE SODIUM 10 MG/ML
40 SYRINGE (ML) INTRAARTICULAR ONCE
Status: COMPLETED | OUTPATIENT
Start: 2021-01-25 | End: 2021-01-25

## 2021-01-25 RX ADMIN — Medication 40 MG: at 10:50

## 2021-01-25 NOTE — PROGRESS NOTES
Rigoberto Castellanos is a 79 y.o. male who is a very pleasant retired white male who does golf on a near daily basis and is a patient of Dr. Jaime Sullivan who had been followed over the last several years by myself and Dr. Bharti Melvin for underlying significant medial compartment osteoarthritis with patellofemoral arthropathy who is being seen back today for recurrence of his knee pain. Did have consultation with Dr. Paula Castillo regarding options down the road for his knee. The bottom line is he is still functioning and pain is tolerable. He did learn that he may be a candidate for partial replacement down the road if conservative treatment is failing him. The patient returns today for their 2nd Euflexxa injection to his knees bilaterally for diagnosis of osteoarthritis. . The risks, benefits, and complications of the injections were again discussed in detail with the patient. The risks discussed included but are not limited to infection, skin reactions, hot swollen joints, and anaphylaxis. The patient gave verbal informed consent for the injection. The patient skin was prepped with iodine and sterile 4x4 gauze pad at the bilateral knee joint was injected with 2 ml of Euflexxa intra-articularly under sterile conditions  into the supra-lateral pouch. The patient tolerated the injection reasonably well. The patient was given instructions to ice the bilateral knee and avoid strenuous activities for 24-48 hours. The patient was instructed to call the office immediately if there is increased pain, redness, warmth, fever, or chills. The patient is aware that he can have repeat Visco supplementation in 6 months but he is been getting at least 1-2 years out of his Euflexxa injection series in the past.  He will be seen back in 1 week to finish up his Euflexxa injections bilaterally.   He is clinically doing reasonably well at this time and is eager to back into golfing and a regular basis    Continue with his home-based exercise program use of his knee braces and his meloxicam 15 mg daily. Will be seen back next week to continue with his Euflexxa injection.

## 2021-02-01 ENCOUNTER — OFFICE VISIT (OUTPATIENT)
Dept: ORTHOPEDIC SURGERY | Age: 71
End: 2021-02-01
Payer: MEDICARE

## 2021-02-01 VITALS — BODY MASS INDEX: 31.5 KG/M2 | HEIGHT: 70 IN | WEIGHT: 220 LBS

## 2021-02-01 DIAGNOSIS — M25.561 CHRONIC PAIN OF BOTH KNEES: ICD-10-CM

## 2021-02-01 DIAGNOSIS — M25.562 CHRONIC PAIN OF BOTH KNEES: ICD-10-CM

## 2021-02-01 DIAGNOSIS — M17.11 PRIMARY OSTEOARTHRITIS OF RIGHT KNEE: ICD-10-CM

## 2021-02-01 DIAGNOSIS — M17.12 PRIMARY OSTEOARTHRITIS OF LEFT KNEE: Primary | ICD-10-CM

## 2021-02-01 DIAGNOSIS — G89.29 CHRONIC PAIN OF BOTH KNEES: ICD-10-CM

## 2021-02-01 PROCEDURE — 20610 DRAIN/INJ JOINT/BURSA W/O US: CPT | Performed by: FAMILY MEDICINE

## 2021-02-01 RX ORDER — HYALURONATE SODIUM 10 MG/ML
40 SYRINGE (ML) INTRAARTICULAR ONCE
Status: COMPLETED | OUTPATIENT
Start: 2021-02-01 | End: 2021-02-01

## 2021-02-01 RX ADMIN — Medication 40 MG: at 09:58

## 2021-02-01 NOTE — PROGRESS NOTES
home-based exercise program use of his knee braces and his meloxicam 15 mg daily. He will contact us with questions or concerns.

## 2021-03-08 ENCOUNTER — OFFICE VISIT (OUTPATIENT)
Dept: ORTHOPEDIC SURGERY | Age: 71
End: 2021-03-08
Payer: MEDICARE

## 2021-03-08 VITALS — WEIGHT: 220 LBS | BODY MASS INDEX: 30.8 KG/M2 | HEIGHT: 71 IN

## 2021-03-08 DIAGNOSIS — M17.11 PRIMARY OSTEOARTHRITIS OF RIGHT KNEE: Primary | ICD-10-CM

## 2021-03-08 DIAGNOSIS — M67.361 TRANSIENT SYNOVITIS OF RIGHT KNEE: ICD-10-CM

## 2021-03-08 PROCEDURE — 3017F COLORECTAL CA SCREEN DOC REV: CPT | Performed by: FAMILY MEDICINE

## 2021-03-08 PROCEDURE — G8417 CALC BMI ABV UP PARAM F/U: HCPCS | Performed by: FAMILY MEDICINE

## 2021-03-08 PROCEDURE — G8484 FLU IMMUNIZE NO ADMIN: HCPCS | Performed by: FAMILY MEDICINE

## 2021-03-08 PROCEDURE — G8427 DOCREV CUR MEDS BY ELIG CLIN: HCPCS | Performed by: FAMILY MEDICINE

## 2021-03-08 PROCEDURE — 20610 DRAIN/INJ JOINT/BURSA W/O US: CPT | Performed by: FAMILY MEDICINE

## 2021-03-08 PROCEDURE — 1123F ACP DISCUSS/DSCN MKR DOCD: CPT | Performed by: FAMILY MEDICINE

## 2021-03-08 PROCEDURE — 1036F TOBACCO NON-USER: CPT | Performed by: FAMILY MEDICINE

## 2021-03-08 PROCEDURE — 99214 OFFICE O/P EST MOD 30 MIN: CPT | Performed by: FAMILY MEDICINE

## 2021-03-08 PROCEDURE — 4040F PNEUMOC VAC/ADMIN/RCVD: CPT | Performed by: FAMILY MEDICINE

## 2021-03-08 RX ORDER — LIDOCAINE HYDROCHLORIDE 10 MG/ML
1 INJECTION, SOLUTION INFILTRATION; PERINEURAL ONCE
Status: COMPLETED | OUTPATIENT
Start: 2021-03-08 | End: 2021-03-08

## 2021-03-08 RX ORDER — BUPIVACAINE HYDROCHLORIDE 2.5 MG/ML
2 INJECTION, SOLUTION INFILTRATION; PERINEURAL ONCE
Status: COMPLETED | OUTPATIENT
Start: 2021-03-08 | End: 2021-03-08

## 2021-03-08 RX ORDER — BETAMETHASONE SODIUM PHOSPHATE AND BETAMETHASONE ACETATE 3; 3 MG/ML; MG/ML
12 INJECTION, SUSPENSION INTRA-ARTICULAR; INTRALESIONAL; INTRAMUSCULAR; SOFT TISSUE ONCE
Status: COMPLETED | OUTPATIENT
Start: 2021-03-08 | End: 2021-03-08

## 2021-03-08 RX ADMIN — LIDOCAINE HYDROCHLORIDE 1 ML: 10 INJECTION, SOLUTION INFILTRATION; PERINEURAL at 09:06

## 2021-03-08 RX ADMIN — BUPIVACAINE HYDROCHLORIDE 5 MG: 2.5 INJECTION, SOLUTION INFILTRATION; PERINEURAL at 09:06

## 2021-03-08 RX ADMIN — BETAMETHASONE SODIUM PHOSPHATE AND BETAMETHASONE ACETATE 12 MG: 3; 3 INJECTION, SUSPENSION INTRA-ARTICULAR; INTRALESIONAL; INTRAMUSCULAR; SOFT TISSUE at 09:05

## 2021-03-08 NOTE — PROGRESS NOTES
Chief Complaint  Follow-up (Finished bilateral knee Euflexxa 02/01/21. Would like steroid injection into right knee)      Nolvia Jameson is a 79 y.o. male who is a very pleasant retired white male who does golf on a near daily basis and is a patient of Dr. Jesus Manuel Dickinson who had been followed over the last several years by myself and Dr. Jay Estrella for underlying significant medial compartment osteoarthritis with patellofemoral arthropathy who is being seen back today for recurrence of his knee pain. He last completed a round of bilateral knee Euflexxa on 12/23/2019 which did help him substantially up until early April 2020 there is no interim history of injury but has been golfing 4 to 5 days/week but it is not raining. Cori Profit He does relate that while this is helped him in the past his last round of Visco supplementation is not nearly as effective as previous rounds have been. He is continue with his exercise program and does takes his his meloxicam fairly consistently. He does use knee sleeves which he got over-the-counter with golfing but typically when he golfs will come home and have to ice his knees. He is able to walk and perform most of his normal activities but typically will be sore post activity. Distance walking walking on uneven surfaces and stairclimbing can produce pain anywhere from a 2-5 out of 10. It is more achy in nature. He has been strongly considering the possibility of knee arthroplasty but is not having substantial night pain. His concern is that he is not getting \"any younger\" and does wish to continue with his normal golfing patterns. Denies locking catching or instability symptoms. He does have crepitation and popping. He does have episodic swelling. No true instability symptoms noted. He was last seen in the office on 4/27/2020 and was having significant discomfort in his knees bilaterally at that point.   With treatment he presents back today stating that he is doing much better would like to consider Visco supplementation once again which is helped him out substantially in the past.  He does not want to consider knee arthroplasty during golf season but may consider this during the winter. Denies locking catching or true instability symptoms. He does utilize his knee braces consistently with golfing as tolerated his meloxicam.  Is locking catching or instability symptoms. Denies rest or night pain. Less pain with stair climbing. He does golf several times per week and but usually uses a cart. Lisa Angel was last seen in the office on 7/13/2020 and was continued on conservative treatment for his bilateral knee osteoarthritis. We completed Euflexxa to his knees bilaterally on 7/13/2020 and was continued on conservative treatment with his home-based exercise program and use of his knee brace. He did very well with viscosupplementation and continues to golf even in the winter several times per week. He has tolerated his meloxicam and does believe he had gotten benefit from receiving his viscosupplementation. He would like to try this again as he is hopeful to put off surgical intervention as much as possible. At most with his pain is only 1-2 out of 10 with distance walking and stair climbing. No night pain mechanical or instability symptoms noted. Lisa Angel was last seen in the office on February 1, 2020 when we finished Euflexxa in his knees bilaterally. Once again he is known to have underlying bilateral knee medial compartment osteoarthritis which is significant with patellofemoral arthropathy and still is golfing at least 3 to 5 days/week. As he was doing well following his last visit, we talked about having him get back on the treadmill which he did do. Unfortunately he immediately started out walking 4 to 5 miles at a time at a neutral incline at about 2-1/2 mph and over time did aggravate his right knee anteriorly and medially. His left knee is doing reasonably well.   He denies locking catching or true instability symptoms. He denied definitive injury and does anticipate golfing 3 days/week at this point this coming week. Denies locking or catching. He has continued with his meloxicam and is requesting a steroid injection to his right knee. His left knee is currently stable. Pain Assessment  Location of Pain: Knee  Location Modifiers: Right  Severity of Pain: 4     Attest: I have reviewed and attest the documentation of the HPI documented by my . I will make any changes if necessary. Enc Date: 3/8/2021  Time: 9:10 AM  Provider: Page Morejon MD        Social History     Tobacco Use    Smoking status: Never Smoker    Smokeless tobacco: Never Used   Substance Use Topics    Alcohol use: Not on file    Drug use: Not on file        Review of Systems  Pertinent items are noted in HPI  Review of systems reviewed from Patient History Form dated on 12/4/2019 and available in the patient's chart under the Media tab. Vital Signs     Ht 5' 10.5\" (1.791 m)   Wt 220 lb (99.8 kg)   BMI 31.12 kg/m²       General Exam:   Constitutional: Patient is adequately groomed with no evidence of malnutrition  DTRs: Deep tendon reflexes are intact  Mental Status: The patient is oriented to time, place and person. The patient's mood and affect are appropriate. Lymphatic: The lymphatic examination bilaterally reveals all areas to be without enlargement or induration. Vascular: Examination reveals no swelling or calf tenderness. Peripheral pulses are palpable and 2+. Neurological: The patient has good coordination. There is no weakness or sensory deficit. Knee Examination  Inspection:  There is no high-grade deformity. Does have some patellofemoral crepitation and trace knee joint effusions.     Palpation:  He does have more mild tenderness over the medial and lateral patellofemoral facet on the right only.   He does have a mildly Positive grind test on the right only.  Some pain with medial joint line palpation but no high-grade meniscal clicks or so on the right.     Rang of Motion:  He is stiff with terminal 10° of extension with flexion to about 110 bilaterally. Hamstrings are mildly tight as is his right IT band.     Strength:  4+ out of 5 with knee flexion and extension.     Special Tests:  Mild pain with patellar grind testing in the right. Mild discomfort with crepitation with medial Scooter's on the right. No high-grade clicks. No high-grade instability. Hamstrings and IT band particularly on the right is tight. No palpable Baker's cyst or evidence of instability. Strength testing appears to relatively benign.     Skin: There are no rashes, ulcerations or lesions. Distal neurovascular exam is intact.     Gait: Fluid smooth gait     Reflex symmetrically preserved     Additional Comments:      Additional Examinations:  Right Lower Extremity: Examination of the right lower extremity does not show any tenderness, deformity or injury. Range of motion is unremarkable. There is no gross instability. There are no rashes, ulcerations or lesions. Strength and tone are normal.  Left Lower Extremity: Examination of the left lower extremity does not show any tenderness, deformity or injury. Range of motion is unremarkable. There is no gross instability. There are no rashes, ulcerations or lesions. Strength and tone are normal.  Examination of the bilateral hip reveals intact skin. The patient demonstrates full painless range of motion with regards to flexion, abduction, internal and external rotation. There is not tenderness about the greater trochanter. There is a negative straight leg raise against resistance.   Strength is 5/5 thorough out all planes.        Diagnostic Test Findings: Updated bilateral knee AP and PA weightbearing sunrise and lateral films were reviewed from 12/4/2019 and does show advanced bilateral knee medial compartment osteoarthritis with effective bone-on-bone changes to the medial compartments. At least moderate right greater than left patellofemoral arthropathy with spurring was noted. Assessment & Plan:    Encounter Diagnoses   Name Primary?  Primary osteoarthritis of right knee Yes    Transient synovitis of right knee        Orders Placed This Encounter   Procedures    [place] ARTHROCENTESIS ASPIR&/INJ MAJOR JT/BURSA W/O US         Treatment Plan:  Treatment options were discussed with Osito Lyon. We did once again review his updated plain films and his exam findings. He does have underlying significant medial compartment bilateral knee osteoarthritis and clinically at this time is doing reasonably well. Most of the time his pain is 0 out of 10 but at maximum he can only be a 1-2 out of 10 but he has been able to golf several days per week. He was doing very well following completion of his bilateral knee Euflexxa on February 1, 2021 but got back to the treadmill and this is aggravated his right knee. He was doing this fairly extensively and did not build up and was up to 4 to 5 miles on the treadmill at a neutral grade at 2.5 mph within the first week and this seems to have aggravated his right knee only. After discussing options, we did inject his right knee today using 2 cc of Celestone, 2 cc of Marcaine, 1 cc of Xylocaine. He once again just finished up Euflexxa on February 1, 2021 bilaterally and would be eligible again in August 2021. He did have surgical consultation in December 2019 with Dr. Omayra Maxwell who recommended that he continues with conservative treatment given his lack of high-grade limitations currently and lack of night pain and reasonable functionality. He may continue with his knee braces and his home-based exercise program as well as his meloxicam 15 mg daily. He is aware that he can have repeat viscosupplementation in about 5 months.   He will contact us in the interim with questions or concerns we will

## 2021-08-02 ENCOUNTER — OFFICE VISIT (OUTPATIENT)
Dept: ORTHOPEDIC SURGERY | Age: 71
End: 2021-08-02
Payer: MEDICARE

## 2021-08-02 DIAGNOSIS — G89.29 CHRONIC PAIN OF BOTH KNEES: ICD-10-CM

## 2021-08-02 DIAGNOSIS — M67.361 TRANSIENT SYNOVITIS OF RIGHT KNEE: ICD-10-CM

## 2021-08-02 DIAGNOSIS — M25.561 CHRONIC PAIN OF BOTH KNEES: ICD-10-CM

## 2021-08-02 DIAGNOSIS — M17.11 PRIMARY OSTEOARTHRITIS OF RIGHT KNEE: Primary | ICD-10-CM

## 2021-08-02 DIAGNOSIS — M25.562 CHRONIC PAIN OF BOTH KNEES: ICD-10-CM

## 2021-08-02 DIAGNOSIS — M17.12 PRIMARY OSTEOARTHRITIS OF LEFT KNEE: ICD-10-CM

## 2021-08-02 PROCEDURE — 1123F ACP DISCUSS/DSCN MKR DOCD: CPT | Performed by: FAMILY MEDICINE

## 2021-08-02 PROCEDURE — 20610 DRAIN/INJ JOINT/BURSA W/O US: CPT | Performed by: FAMILY MEDICINE

## 2021-08-02 PROCEDURE — 1036F TOBACCO NON-USER: CPT | Performed by: FAMILY MEDICINE

## 2021-08-02 PROCEDURE — 3017F COLORECTAL CA SCREEN DOC REV: CPT | Performed by: FAMILY MEDICINE

## 2021-08-02 PROCEDURE — G8417 CALC BMI ABV UP PARAM F/U: HCPCS | Performed by: FAMILY MEDICINE

## 2021-08-02 PROCEDURE — 99213 OFFICE O/P EST LOW 20 MIN: CPT | Performed by: FAMILY MEDICINE

## 2021-08-02 PROCEDURE — 4040F PNEUMOC VAC/ADMIN/RCVD: CPT | Performed by: FAMILY MEDICINE

## 2021-08-02 PROCEDURE — G8428 CUR MEDS NOT DOCUMENT: HCPCS | Performed by: FAMILY MEDICINE

## 2021-08-02 RX ORDER — HYALURONATE SODIUM 10 MG/ML
40 SYRINGE (ML) INTRAARTICULAR ONCE
Status: COMPLETED | OUTPATIENT
Start: 2021-08-02 | End: 2021-08-02

## 2021-08-02 RX ADMIN — Medication 40 MG: at 10:01

## 2021-08-02 NOTE — PROGRESS NOTES
substantially in the past.  He does not want to consider knee arthroplasty during golf season but may consider this during the winter. Denies locking catching or true instability symptoms. He does utilize his knee braces consistently with golfing as tolerated his meloxicam.  Is locking catching or instability symptoms. Denies rest or night pain. Less pain with stair climbing. He does golf several times per week and but usually uses a cart. Grecia Pedraza was last seen in the office on 7/13/2020 and was continued on conservative treatment for his bilateral knee osteoarthritis. We completed Euflexxa to his knees bilaterally on 7/13/2020 and was continued on conservative treatment with his home-based exercise program and use of his knee brace. He did very well with viscosupplementation and continues to golf even in the winter several times per week. He has tolerated his meloxicam and does believe he had gotten benefit from receiving his viscosupplementation. He would like to try this again as he is hopeful to put off surgical intervention as much as possible. At most with his pain is only 1-2 out of 10 with distance walking and stair climbing. No night pain mechanical or instability symptoms noted. Grecia Pedraza was last seen in the office on February 1, 2020 when we finished Euflexxa in his knees bilaterally. Once again he is known to have underlying bilateral knee medial compartment osteoarthritis which is significant with patellofemoral arthropathy and still is golfing at least 3 to 5 days/week. As he was doing well following his last visit, we talked about having him get back on the treadmill which he did do. Unfortunately he immediately started out walking 4 to 5 miles at a time at a neutral incline at about 2-1/2 mph and over time did aggravate his right knee anteriorly and medially. His left knee is doing reasonably well. He denies locking catching or true instability symptoms.   He denied definitive injury and does anticipate golfing 3 days/week at this point this coming week. Denies locking or catching. He has continued with his meloxicam and is requesting a steroid injection to his right knee. His left knee is currently stable. In the office on 3/8/2021 and was continued on conservative treatment for his significant bilateral knee osteoarthritis with patellofemoral arthropathy. He last completed viscosupplementation to his knees on 2/1/2021. He does get a little bit sore and achy but is still able to golf 4 to 5 days/week. Does use his knee sleeves and does work on his home-based exercise program.  Denies locking catching or true instability symptoms. His right knee still is more consistently painful. Denies locking and catching. He is continue with his meloxicam.  He would like to pursue viscosupplementation once again. Attest: I have reviewed and attest the documentation of the HPI documented by my . I will make any changes if necessary. Enc Date: 8/2/2021  Time: 10:15 AM  Provider: Jorge Kaur MD        Social History     Tobacco Use    Smoking status: Never Smoker    Smokeless tobacco: Never Used   Substance Use Topics    Alcohol use: Not on file    Drug use: Not on file        Review of Systems  Pertinent items are noted in HPI  Review of systems reviewed from Patient History Form dated on 12/4/2019 and available in the patient's chart under the Media tab. Vital Signs     There were no vitals taken for this visit. General Exam:   Constitutional: Patient is adequately groomed with no evidence of malnutrition  DTRs: Deep tendon reflexes are intact  Mental Status: The patient is oriented to time, place and person. The patient's mood and affect are appropriate. Lymphatic: The lymphatic examination bilaterally reveals all areas to be without enlargement or induration. Vascular: Examination reveals no swelling or calf tenderness.   Peripheral pulses are palpable and 2+. Neurological: The patient has good coordination. There is no weakness or sensory deficit. Knee Examination  Inspection:  There is no high-grade deformity. Does have some patellofemoral crepitation and trace knee joint effusions.     Palpation:  He does have more mild tenderness over the medial and lateral patellofemoral facet on the right only. He does have a mildly Positive grind test on the right only. Some pain with medial joint line palpation but no high-grade meniscal clicks or so on the right.     Rang of Motion:  He is stiff with terminal 10° of extension with flexion to about 110 bilaterally. Hamstrings are mildly tight as is his right IT band.     Strength:  4+ out of 5 with knee flexion and extension.     Special Tests:  Mild pain with patellar grind testing in the right. Mild discomfort with crepitation with medial Scooter's on the right. No high-grade clicks. No high-grade instability. Hamstrings and IT band particularly on the right is tight. No palpable Baker's cyst or evidence of instability. Strength testing appears to relatively benign.     Skin: There are no rashes, ulcerations or lesions. Distal neurovascular exam is intact.     Gait: Fluid smooth gait     Reflex symmetrically preserved     Additional Comments:      Additional Examinations:  Right Lower Extremity: Examination of the right lower extremity does not show any tenderness, deformity or injury. Range of motion is unremarkable. There is no gross instability. There are no rashes, ulcerations or lesions. Strength and tone are normal.  Left Lower Extremity: Examination of the left lower extremity does not show any tenderness, deformity or injury. Range of motion is unremarkable. There is no gross instability. There are no rashes, ulcerations or lesions. Strength and tone are normal.  Examination of the bilateral hip reveals intact skin.   The patient demonstrates full painless range of motion with regards to flexion, abduction, internal and external rotation. There is not tenderness about the greater trochanter. There is a negative straight leg raise against resistance. Strength is 5/5 thorough out all planes.        Diagnostic Test Findings: Updated bilateral knee AP and PA weightbearing sunrise and lateral films were reviewed from 12/4/2019 and does show advanced bilateral knee medial compartment osteoarthritis with effective bone-on-bone changes to the medial compartments. At least moderate right greater than left patellofemoral arthropathy with spurring was noted. Assessment & Plan:    Encounter Diagnoses   Name Primary?  Primary osteoarthritis of right knee Yes    Transient synovitis of right knee     Primary osteoarthritis of left knee     Chronic pain of both knees        Orders Placed This Encounter   Procedures    [id number] - [place] DRAIN/INJECT LARGE JOINT/BURSA         Treatment Plan:  Treatment options were discussed with Humphrey Alston. We did once again review his updated plain films and his exam findings. He does have underlying significant medial compartment bilateral knee osteoarthritis and clinically at this time is doing reasonably well. Most of the time his pain is 1-2 out of 10 but at maximum he can only be a 2-3 out of 10 but he has been able to golf several days per week. Has been frequently golfing this summer and after discussion of pros and cons of viscosupplementation, he did receive his first injection of Euflexxa to his knees bilaterally. This was performed using a standard prefilled 2 cc syringe. Effie Livingston He did have surgical consultation in December 2019 with Dr. Ivania Gayle who recommended that he continues with conservative treatment given his lack of high-grade limitations currently and lack of night pain and reasonable functionality. He may continue with his knee braces and his home-based exercise program as well as his meloxicam 15 mg daily.   He will be seen back each in the next 2 weeks to continue with his Euflexxa series. Camden Bill He will contact us in the interim with questions or concerns we will hold off on treadmill although we did discuss alternatives such as recumbent biking. He does not really have a problem with walking outside when the weather is nice and this is mostly an issue in the winter. He will contact us in the interim with questions or concerns. This dictation was performed with a verbal recognition program (DRAGON) and it was checked for errors. It is possible that there are still dictated errors within this office note. If so, please bring any errors to my attention for an addendum. All efforts were made to ensure that this office note is accurate.

## 2021-08-09 ENCOUNTER — OFFICE VISIT (OUTPATIENT)
Dept: ORTHOPEDIC SURGERY | Age: 71
End: 2021-08-09
Payer: MEDICARE

## 2021-08-09 DIAGNOSIS — G89.29 CHRONIC PAIN OF BOTH KNEES: ICD-10-CM

## 2021-08-09 DIAGNOSIS — M17.11 PRIMARY OSTEOARTHRITIS OF RIGHT KNEE: Primary | ICD-10-CM

## 2021-08-09 DIAGNOSIS — M25.562 CHRONIC PAIN OF BOTH KNEES: ICD-10-CM

## 2021-08-09 DIAGNOSIS — M17.12 PRIMARY OSTEOARTHRITIS OF LEFT KNEE: ICD-10-CM

## 2021-08-09 DIAGNOSIS — M25.561 CHRONIC PAIN OF BOTH KNEES: ICD-10-CM

## 2021-08-09 PROCEDURE — 20610 DRAIN/INJ JOINT/BURSA W/O US: CPT | Performed by: FAMILY MEDICINE

## 2021-08-09 RX ORDER — HYALURONATE SODIUM 10 MG/ML
40 SYRINGE (ML) INTRAARTICULAR ONCE
Status: COMPLETED | OUTPATIENT
Start: 2021-08-09 | End: 2021-08-09

## 2021-08-09 RX ADMIN — Medication 40 MG: at 10:06

## 2021-08-09 NOTE — PROGRESS NOTES
Giovanni Thomas is a 79 y.o. male who is a very pleasant retired white male who does golf on a near daily basis and is a patient of Dr. Carly Shi who had been followed over the last several years by myself and Dr. Dairl Kawasaki for underlying significant medial compartment osteoarthritis with patellofemoral arthropathy who is being seen back today for recurrence of his knee pain. Did have consultation with Dr. Wisam Brown regarding options down the road for his knee. The bottom line is he is still functioning and pain is tolerable. He did learn that he may be a candidate for partial replacement down the road if conservative treatment is failing him. The patient returns today for their 2nd Euflexxa injection to his knees bilaterally for diagnosis of osteoarthritis. . The risks, benefits, and complications of the injections were again discussed in detail with the patient. The risks discussed included but are not limited to infection, skin reactions, hot swollen joints, and anaphylaxis. The patient gave verbal informed consent for the injection. The patient skin was prepped with iodine and sterile 4x4 gauze pad at the bilateral knee joint was injected with 2 ml of Euflexxa intra-articularly under sterile conditions  into the supra-lateral pouch. The patient tolerated the injection reasonably well. The patient was given instructions to ice the bilateral knee and avoid strenuous activities for 24-48 hours. The patient was instructed to call the office immediately if there is increased pain, redness, warmth, fever, or chills. The patient is aware that he can have repeat Visco supplementation in 6 months but he is been getting at least 1-2 years out of his Euflexxa injection series in the past.  He will be seen back in 1 week to finish up his Euflexxa injections bilaterally.   He is clinically doing reasonably well at this time and is eager to back into golfing and a regular basis    Continue with his home-based exercise program use of his knee braces and his meloxicam 15 mg daily. Will be seen back next week to continue with his Euflexxa injection. We did discuss use of a recumbent bike for exercise. He will be seen back next week to finish up his Euflexxa series.

## 2021-08-16 ENCOUNTER — OFFICE VISIT (OUTPATIENT)
Dept: ORTHOPEDIC SURGERY | Age: 71
End: 2021-08-16
Payer: MEDICARE

## 2021-08-16 DIAGNOSIS — M25.561 CHRONIC PAIN OF BOTH KNEES: ICD-10-CM

## 2021-08-16 DIAGNOSIS — M25.562 CHRONIC PAIN OF BOTH KNEES: ICD-10-CM

## 2021-08-16 DIAGNOSIS — M17.11 PRIMARY OSTEOARTHRITIS OF RIGHT KNEE: Primary | ICD-10-CM

## 2021-08-16 DIAGNOSIS — G89.29 CHRONIC PAIN OF BOTH KNEES: ICD-10-CM

## 2021-08-16 DIAGNOSIS — M17.12 PRIMARY OSTEOARTHRITIS OF LEFT KNEE: ICD-10-CM

## 2021-08-16 PROCEDURE — 20610 DRAIN/INJ JOINT/BURSA W/O US: CPT | Performed by: FAMILY MEDICINE

## 2021-08-16 RX ORDER — HYALURONATE SODIUM 10 MG/ML
40 SYRINGE (ML) INTRAARTICULAR ONCE
Status: COMPLETED | OUTPATIENT
Start: 2021-08-16 | End: 2021-08-16

## 2021-08-16 RX ADMIN — Medication 40 MG: at 10:00

## 2021-08-16 NOTE — PROGRESS NOTES
Giovanni Thomas is a 70 y.o. male who is a very pleasant retired white male who does golf on a near daily basis and is a patient of Dr. Carly Shi who had been followed over the last several years by myself and Dr. Dairl Kawasaki for underlying significant medial compartment osteoarthritis with patellofemoral arthropathy who is being seen back today for recurrence of his knee pain. Did have consultation with Dr. Wisam Brown regarding options down the road for his knee. The bottom line is he is still functioning and pain is tolerable. He did learn that he may be a candidate for partial replacement down the road if conservative treatment is failing him. The patient returns today for their third Euflexxa injection to his knees bilaterally for diagnosis of osteoarthritis. . The risks, benefits, and complications of the injections were again discussed in detail with the patient. The risks discussed included but are not limited to infection, skin reactions, hot swollen joints, and anaphylaxis. The patient gave verbal informed consent for the injection. The patient skin was prepped with iodine and sterile 4x4 gauze pad at the bilateral knee joint was injected with 2 ml of Euflexxa intra-articularly under sterile conditions  into the supra-lateral pouch. The patient tolerated the injection reasonably well. The patient was given instructions to ice the bilateral knee and avoid strenuous activities for 24-48 hours. The patient was instructed to call the office immediately if there is increased pain, redness, warmth, fever, or chills. The patient is aware that he can have repeat Visco supplementation in 6 months but he is been getting at least 1-2 years out of his Euflexxa injection series in the past.  He will be seen back in 1 week to finish up his Euflexxa injections bilaterally.   He is clinically doing reasonably well at this time and is eager to back into golfing and a regular basis    Continue with his home-based exercise program use of his knee braces and his meloxicam 15 mg daily. He is aware that he can repeat viscosupplementation at 6-month intervals or interim steroid injection if needed. He will contact us in the interim with questions or concerns.

## 2021-10-06 ENCOUNTER — OFFICE VISIT (OUTPATIENT)
Dept: ORTHOPEDIC SURGERY | Age: 71
End: 2021-10-06
Payer: MEDICARE

## 2021-10-06 DIAGNOSIS — M25.562 CHRONIC PAIN OF BOTH KNEES: ICD-10-CM

## 2021-10-06 DIAGNOSIS — G89.29 CHRONIC PAIN OF BOTH KNEES: ICD-10-CM

## 2021-10-06 DIAGNOSIS — M17.12 PRIMARY OSTEOARTHRITIS OF LEFT KNEE: ICD-10-CM

## 2021-10-06 DIAGNOSIS — M67.361 TRANSIENT SYNOVITIS OF RIGHT KNEE: ICD-10-CM

## 2021-10-06 DIAGNOSIS — M17.11 PRIMARY OSTEOARTHRITIS OF RIGHT KNEE: Primary | ICD-10-CM

## 2021-10-06 DIAGNOSIS — M25.561 CHRONIC PAIN OF BOTH KNEES: ICD-10-CM

## 2021-10-06 PROCEDURE — 3017F COLORECTAL CA SCREEN DOC REV: CPT | Performed by: FAMILY MEDICINE

## 2021-10-06 PROCEDURE — 4040F PNEUMOC VAC/ADMIN/RCVD: CPT | Performed by: FAMILY MEDICINE

## 2021-10-06 PROCEDURE — G8427 DOCREV CUR MEDS BY ELIG CLIN: HCPCS | Performed by: FAMILY MEDICINE

## 2021-10-06 PROCEDURE — 1123F ACP DISCUSS/DSCN MKR DOCD: CPT | Performed by: FAMILY MEDICINE

## 2021-10-06 PROCEDURE — G8417 CALC BMI ABV UP PARAM F/U: HCPCS | Performed by: FAMILY MEDICINE

## 2021-10-06 PROCEDURE — G8484 FLU IMMUNIZE NO ADMIN: HCPCS | Performed by: FAMILY MEDICINE

## 2021-10-06 PROCEDURE — 1036F TOBACCO NON-USER: CPT | Performed by: FAMILY MEDICINE

## 2021-10-06 PROCEDURE — 20610 DRAIN/INJ JOINT/BURSA W/O US: CPT | Performed by: FAMILY MEDICINE

## 2021-10-06 PROCEDURE — 99213 OFFICE O/P EST LOW 20 MIN: CPT | Performed by: FAMILY MEDICINE

## 2021-10-06 RX ORDER — BETAMETHASONE SODIUM PHOSPHATE AND BETAMETHASONE ACETATE 3; 3 MG/ML; MG/ML
24 INJECTION, SUSPENSION INTRA-ARTICULAR; INTRALESIONAL; INTRAMUSCULAR; SOFT TISSUE ONCE
Status: COMPLETED | OUTPATIENT
Start: 2021-10-06 | End: 2021-10-06

## 2021-10-06 RX ORDER — LIDOCAINE HYDROCHLORIDE 10 MG/ML
2 INJECTION, SOLUTION INFILTRATION; PERINEURAL ONCE
Status: COMPLETED | OUTPATIENT
Start: 2021-10-06 | End: 2021-10-06

## 2021-10-06 RX ORDER — BUPIVACAINE HYDROCHLORIDE 2.5 MG/ML
4 INJECTION, SOLUTION INFILTRATION; PERINEURAL ONCE
Status: COMPLETED | OUTPATIENT
Start: 2021-10-06 | End: 2021-10-06

## 2021-10-06 RX ADMIN — LIDOCAINE HYDROCHLORIDE 2 ML: 10 INJECTION, SOLUTION INFILTRATION; PERINEURAL at 11:52

## 2021-10-06 RX ADMIN — BETAMETHASONE SODIUM PHOSPHATE AND BETAMETHASONE ACETATE 24 MG: 3; 3 INJECTION, SUSPENSION INTRA-ARTICULAR; INTRALESIONAL; INTRAMUSCULAR; SOFT TISSUE at 11:51

## 2021-10-06 RX ADMIN — BUPIVACAINE HYDROCHLORIDE 10 MG: 2.5 INJECTION, SOLUTION INFILTRATION; PERINEURAL at 11:51

## 2021-10-06 NOTE — PROGRESS NOTES
Chief Complaint  Knee Pain (CK BILATERAL KNEES/REQ CORTISONE)      Marcelino aWlton is a 70 y.o. male who is a very pleasant retired white male who does golf on a near daily basis and is a patient of Dr. Valencia Curran who had been followed over the last several years by myself and Dr. 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 He last completed a round of bilateral knee Euflexxa on 12/23/2019 which did help him substantially up until early April 2020 there is no interim history of injury but has been golfing 4 to 5 days/week but it is not raining. Annamrie Ulloa He does relate that while this is helped him in the past his last round of Visco supplementation is not nearly as effective as previous rounds have been. He is continue with his exercise program and does takes his his meloxicam fairly consistently. He does use knee sleeves which he got over-the-counter with golfing but typically when he golfs will come home and have to ice his knees. He is able to walk and perform most of his normal activities but typically will be sore post activity. Distance walking walking on uneven surfaces and stairclimbing can produce pain anywhere from a 2-5 out of 10. It is more achy in nature. He has been strongly considering the possibility of knee arthroplasty but is not having substantial night pain. His concern is that he is not getting \"any younger\" and does wish to continue with his normal golfing patterns. Denies locking catching or instability symptoms. He does have crepitation and popping. He does have episodic swelling. No true instability symptoms noted. He was last seen in the office on 4/27/2020 and was having significant discomfort in his knees bilaterally at that point.   With treatment he presents back today stating that he is doing much better would like to consider Visco supplementation once again which is helped him out substantially in the past.  He does not want to consider knee arthroplasty during golf season but may consider this during the winter. Denies locking catching or true instability symptoms. He does utilize his knee braces consistently with golfing as tolerated his meloxicam.  Is locking catching or instability symptoms. Denies rest or night pain. Less pain with stair climbing. He does golf several times per week and but usually uses a cart. John Saini was last seen in the office on 7/13/2020 and was continued on conservative treatment for his bilateral knee osteoarthritis. We completed Euflexxa to his knees bilaterally on 7/13/2020 and was continued on conservative treatment with his home-based exercise program and use of his knee brace. He did very well with viscosupplementation and continues to golf even in the winter several times per week. He has tolerated his meloxicam and does believe he had gotten benefit from receiving his viscosupplementation. He would like to try this again as he is hopeful to put off surgical intervention as much as possible. At most with his pain is only 1-2 out of 10 with distance walking and stair climbing. No night pain mechanical or instability symptoms noted. John Saini was last seen in the office on February 1, 2020 when we finished Euflexxa in his knees bilaterally. Once again he is known to have underlying bilateral knee medial compartment osteoarthritis which is significant with patellofemoral arthropathy and still is golfing at least 3 to 5 days/week. As he was doing well following his last visit, we talked about having him get back on the treadmill which he did do. Unfortunately he immediately started out walking 4 to 5 miles at a time at a neutral incline at about 2-1/2 mph and over time did aggravate his right knee anteriorly and medially. His left knee is doing reasonably well. He denies locking catching or true instability symptoms.   He  Smoking status: Never Smoker    Smokeless tobacco: Never Used   Substance Use Topics    Alcohol use: Not on file    Drug use: Not on file        Review of Systems  Pertinent items are noted in HPI  Review of systems reviewed from Patient History Form dated on 12/4/2019 and available in the patient's chart under the Media tab. Vital Signs     There were no vitals taken for this visit. General Exam:   Constitutional: Patient is adequately groomed with no evidence of malnutrition  DTRs: Deep tendon reflexes are intact  Mental Status: The patient is oriented to time, place and person. The patient's mood and affect are appropriate. Lymphatic: The lymphatic examination bilaterally reveals all areas to be without enlargement or induration. Vascular: Examination reveals no swelling or calf tenderness. Peripheral pulses are palpable and 2+. Neurological: The patient has good coordination. There is no weakness or sensory deficit. Knee Examination  Inspection:  There is no high-grade deformity. Does have some patellofemoral crepitation and trace knee joint effusions.     Palpation:  He does have more mild tenderness over the medial and lateral patellofemoral facet on the right more so than left He does have a mildly Positive grind test on the right only. Some pain with medial joint line palpation but no high-grade meniscal clicks or so on the right.     Rang of Motion:  He is stiff with terminal 10° of extension with flexion to about 110 bilaterally. Hamstrings are mildly tight as is his right IT band.     Strength:  4+ out of 5 with knee flexion and extension.     Special Tests:  Mild pain with patellar grind testing in the right. Mild discomfort with crepitation with medial Scooter's on the right. No high-grade clicks. No high-grade instability. Hamstrings and IT band particularly on the right is tight. No palpable Baker's cyst or evidence of instability.   Strength testing appears to relatively benign.     Skin: There are no rashes, ulcerations or lesions. Distal neurovascular exam is intact.     Gait: Fluid smooth gait     Reflex symmetrically preserved     Additional Comments:      Additional Examinations:  Right Lower Extremity: Examination of the right lower extremity does not show any tenderness, deformity or injury. Range of motion is unremarkable. There is no gross instability. There are no rashes, ulcerations or lesions. Strength and tone are normal.  Left Lower Extremity: Examination of the left lower extremity does not show any tenderness, deformity or injury. Range of motion is unremarkable. There is no gross instability. There are no rashes, ulcerations or lesions. Strength and tone are normal.  Examination of the bilateral hip reveals intact skin. The patient demonstrates full painless range of motion with regards to flexion, abduction, internal and external rotation. There is not tenderness about the greater trochanter. There is a negative straight leg raise against resistance. Strength is 5/5 thorough out all planes.        Diagnostic Test Findings: Updated bilateral knee AP and PA weightbearing sunrise and lateral films were reviewed from 12/4/2019 and does show advanced bilateral knee medial compartment osteoarthritis with effective bone-on-bone changes to the medial compartments. At least moderate right greater than left patellofemoral arthropathy with spurring was noted. Assessment & Plan:    Encounter Diagnoses   Name Primary?  Primary osteoarthritis of right knee Yes    Primary osteoarthritis of left knee     Chronic pain of both knees     Transient synovitis of right knee        Orders Placed This Encounter   Procedures    [id number] - [facility] DRAIN/INJECT LARGE JOINT/BURSA         Treatment Plan:  Treatment options were discussed with Al Poster. We did once again review his updated plain films and his exam findings.   He does have underlying significant medial compartment bilateral knee osteoarthritis and clinically at this time is doing reasonably well. Most of the time his pain is 1-2 out of 10 but at maximum he can only be a 2-3 out of 10 but he has been able to golf 4-5 days per week. He last completed viscosupplementation on 8/16/2021 but with his increasing achiness, we did inject both knees today using 2 cc of Celestone, 2 cc of Marcaine, 1 cc of Xylocaine. We had a very long 25 to 30-minute discussion today regarding long-term treatment options including knee replacement. Loren Gaspar He did have surgical consultation in December 2019 with Dr. Farzaneh Guillaume who recommended that he continues with conservative treatment given his lack of high-grade limitations currently and lack of night pain and reasonable functionality. He may continue with his knee braces and his home-based exercise program as well as his meloxicam 15 mg daily. He is aware that he can have repeat viscosupplementation in mid February 2022. He may continue with his stretching and exercise program with periodic treadmill avoiding inclines as well as biking. We also discussed potential incorporation of cool leaf radiofrequency ablation which she will contemplate. We will see him back in a few months for follow-up we will contact us in the interim with questions or concerns. This dictation was performed with a verbal recognition program (DRAGON) and it was checked for errors. It is possible that there are still dictated errors within this office note. If so, please bring any errors to my attention for an addendum. All efforts were made to ensure that this office note is accurate.

## 2022-03-07 ENCOUNTER — OFFICE VISIT (OUTPATIENT)
Dept: ORTHOPEDIC SURGERY | Age: 72
End: 2022-03-07
Payer: MEDICARE

## 2022-03-07 DIAGNOSIS — M17.12 PRIMARY OSTEOARTHRITIS OF LEFT KNEE: ICD-10-CM

## 2022-03-07 DIAGNOSIS — M17.11 PRIMARY OSTEOARTHRITIS OF RIGHT KNEE: Primary | ICD-10-CM

## 2022-03-07 DIAGNOSIS — M25.562 CHRONIC PAIN OF BOTH KNEES: ICD-10-CM

## 2022-03-07 DIAGNOSIS — M25.561 CHRONIC PAIN OF BOTH KNEES: ICD-10-CM

## 2022-03-07 DIAGNOSIS — G89.29 CHRONIC PAIN OF BOTH KNEES: ICD-10-CM

## 2022-03-07 PROCEDURE — 20610 DRAIN/INJ JOINT/BURSA W/O US: CPT | Performed by: FAMILY MEDICINE

## 2022-03-07 RX ORDER — LIDOCAINE HYDROCHLORIDE 10 MG/ML
2 INJECTION, SOLUTION INFILTRATION; PERINEURAL ONCE
Status: COMPLETED | OUTPATIENT
Start: 2022-03-07 | End: 2022-03-07

## 2022-03-07 RX ORDER — BETAMETHASONE SODIUM PHOSPHATE AND BETAMETHASONE ACETATE 3; 3 MG/ML; MG/ML
24 INJECTION, SUSPENSION INTRA-ARTICULAR; INTRALESIONAL; INTRAMUSCULAR; SOFT TISSUE ONCE
Status: COMPLETED | OUTPATIENT
Start: 2022-03-07 | End: 2022-03-07

## 2022-03-07 RX ORDER — BUPIVACAINE HYDROCHLORIDE 2.5 MG/ML
4 INJECTION, SOLUTION INFILTRATION; PERINEURAL ONCE
Status: COMPLETED | OUTPATIENT
Start: 2022-03-07 | End: 2022-03-07

## 2022-03-07 RX ADMIN — BUPIVACAINE HYDROCHLORIDE 10 MG: 2.5 INJECTION, SOLUTION INFILTRATION; PERINEURAL at 09:53

## 2022-03-07 RX ADMIN — BETAMETHASONE SODIUM PHOSPHATE AND BETAMETHASONE ACETATE 24 MG: 3; 3 INJECTION, SUSPENSION INTRA-ARTICULAR; INTRALESIONAL; INTRAMUSCULAR; SOFT TISSUE at 09:53

## 2022-03-07 RX ADMIN — LIDOCAINE HYDROCHLORIDE 2 ML: 10 INJECTION, SOLUTION INFILTRATION; PERINEURAL at 09:53

## 2022-03-07 NOTE — PROGRESS NOTES
Chief Complaint  Knee Pain (CK BILATERAL KNEES)      [place] is a 70 y.o. male who is a very pleasant retired white male who does golf on a near daily basis and is a patient of Dr. Javier Ibarra who had been followed over the last several years by myself and Dr. 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 He last completed a round of bilateral knee Euflexxa on 12/23/2019 which did help him substantially up until early April 2020 there is no interim history of injury but has been golfing 4 to 5 days/week but it is not raining. [place] [employer] He does relate that while this is helped him in the past his last round of Visco supplementation is not nearly as effective as previous rounds have been. He is continue with his exercise program and does takes his his meloxicam fairly consistently. He does use knee sleeves which he got over-the-counter with golfing but typically when he golfs will come home and have to ice his knees. He is able to walk and perform most of his normal activities but typically will be sore post activity. Distance walking walking on uneven surfaces and stairclimbing can produce pain anywhere from a 2-5 out of 10. It is more achy in nature. He has been strongly considering the possibility of knee arthroplasty but is not having substantial night pain. His concern is that he is not getting \"any younger\" and does wish to continue with his normal golfing patterns. Denies locking catching or instability symptoms. He does have crepitation and popping. He does have episodic swelling. No true instability symptoms noted. He was last seen in the office on 4/27/2020 and was having significant discomfort in his knees bilaterally at that point.   With treatment he presents back today stating that he is doing much better would like to consider Visco supplementation once again which is helped him out substantially in the past.  He does not want to consider knee arthroplasty during golf season but may consider this during the winter. Denies locking catching or true instability symptoms. He does utilize his knee braces consistently with golfing as tolerated his meloxicam.  Is locking catching or instability symptoms. Denies rest or night pain. Less pain with stair climbing. He does golf several times per week and but usually uses a cart. Eric Joyce was last seen in the office on 7/13/2020 and was continued on conservative treatment for his bilateral knee osteoarthritis. We completed Euflexxa to his knees bilaterally on 7/13/2020 and was continued on conservative treatment with his home-based exercise program and use of his knee brace. He did very well with viscosupplementation and continues to golf even in the winter several times per week. He has tolerated his meloxicam and does believe he had gotten benefit from receiving his viscosupplementation. He would like to try this again as he is hopeful to put off surgical intervention as much as possible. At most with his pain is only 1-2 out of 10 with distance walking and stair climbing. No night pain mechanical or instability symptoms noted. Eric Joyce was last seen in the office on February 1, 2020 when we finished Euflexxa in his knees bilaterally. Once again he is known to have underlying bilateral knee medial compartment osteoarthritis which is significant with patellofemoral arthropathy and still is golfing at least 3 to 5 days/week. As he was doing well following his last visit, we talked about having him get back on the treadmill which he did do. Unfortunately he immediately started out walking 4 to 5 miles at a time at a neutral incline at about 2-1/2 mph and over time did aggravate his right knee anteriorly and medially. His left knee is doing reasonably well. He denies locking catching or true instability symptoms.   He denied definitive injury and does anticipate golfing 3 days/week at this point this coming week. Denies locking or catching. He has continued with his meloxicam and is requesting a steroid injection to his right knee. His left knee is currently stable. In the office on 3/8/2021 and was continued on conservative treatment for his significant bilateral knee osteoarthritis with patellofemoral arthropathy. He last completed viscosupplementation to his knees on 2/1/2021. He does get a little bit sore and achy but is still able to golf 4 to 5 days/week. Does use his knee sleeves and does work on his home-based exercise program.  Denies locking catching or true instability symptoms. His right knee still is more consistently painful. Denies locking and catching. He is continue with his meloxicam.  He would like to pursue viscosupplementation once again. We last saw Keshia Baker in the office on 8/16/2021 when we finished up Euflexxa to his knees bilaterally. He is doing reasonably well particular with the left knee but does have achiness which he describes more as a throbbing up to about a 3 to 4-10 particularly if he does golf and then subsequently come home and do some yard work. He is continue with his meloxicam and does utilize his braces periodically. His left knee is doing reasonably well. He is not having substantial rest or night pain. He is questioning as to whether or not his last round of viscosupplementation was as effective as those were previously and has been contemplating knee replacement but still is able to walk and golf 4 to 5 days/week and is not having night pain. He is currently states that his knee pain symptoms bilaterally are tolerable. We last saw Keshia Baker in the office on 10/6/2021 for his underlying significant bilateral knee osteoarthritis. He has become a little bit more sore and achy in his knees and is trying to work on a stretching and exercise program and does use his sleeves. His left knee once again is doing reasonably well but still has some soreness which he rates at 4-5 out of 10 on his right knee if he develops 4 to 5 days a week. Thankfully is not having great deal of rest or night pain and denies mechanical or instability symptoms. He is continue with his meloxicam.  He is trying to avoid knee surgery as long as he can and last finished up viscosupplementation on his knees on 8/16/2021. Attest: I have reviewed and attest the documentation of the HPI documented by my . I will make any changes if necessary. Enc Date: 3/7/2022  Time: 10:16 AM  Provider: Sudarshan Costa MD        Social History     Tobacco Use    Smoking status: Never Smoker    Smokeless tobacco: Never Used   Substance Use Topics    Alcohol use: Not on file    Drug use: Not on file        Review of Systems  Pertinent items are noted in HPI  Review of systems reviewed from Patient History Form dated on 12/4/2019 and available in the patient's chart under the Media tab. Vital Signs     There were no vitals taken for this visit. General Exam:   Constitutional: Patient is adequately groomed with no evidence of malnutrition  DTRs: Deep tendon reflexes are intact  Mental Status: The patient is oriented to time, place and person. The patient's mood and affect are appropriate. Lymphatic: The lymphatic examination bilaterally reveals all areas to be without enlargement or induration. Vascular: Examination reveals no swelling or calf tenderness. Peripheral pulses are palpable and 2+. Neurological: The patient has good coordination. There is no weakness or sensory deficit. Knee Examination  Inspection:  There is no high-grade deformity.   Does have some patellofemoral crepitation and trace knee joint effusions.     Palpation:  He does have more mild tenderness over the medial and lateral patellofemoral facet on the right more so than left He does have a mildly Positive grind test on the right only. Some pain with medial joint line palpation but no high-grade meniscal clicks or so on the right.     Rang of Motion:  He is stiff with terminal 10° of extension with flexion to about 110 bilaterally. Hamstrings are mildly tight as is his right IT band.     Strength:  4+ out of 5 with knee flexion and extension.     Special Tests:  Mild pain with patellar grind testing in the right. Mild discomfort with crepitation with medial Scooter's on the right. No high-grade clicks. No high-grade instability. Hamstrings and IT band particularly on the right is tight. No palpable Baker's cyst or evidence of instability. Strength testing appears to relatively benign.     Skin: There are no rashes, ulcerations or lesions. Distal neurovascular exam is intact.     Gait: Fluid smooth gait     Reflex symmetrically preserved     Additional Comments:      Additional Examinations:  Right Lower Extremity: Examination of the right lower extremity does not show any tenderness, deformity or injury. Range of motion is unremarkable. There is no gross instability. There are no rashes, ulcerations or lesions. Strength and tone are normal.  Left Lower Extremity: Examination of the left lower extremity does not show any tenderness, deformity or injury. Range of motion is unremarkable. There is no gross instability. There are no rashes, ulcerations or lesions. Strength and tone are normal.  Examination of the bilateral hip reveals intact skin. The patient demonstrates full painless range of motion with regards to flexion, abduction, internal and external rotation. There is not tenderness about the greater trochanter. There is a negative straight leg raise against resistance.   Strength is 5/5 thorough out all planes.        Diagnostic Test Findings: Updated bilateral knee AP and PA weightbearing sunrise and lateral films were reviewed from 12/4/2019 and does show advanced bilateral knee medial compartment osteoarthritis with effective bone-on-bone changes to the medial compartments. At least moderate right greater than left patellofemoral arthropathy with spurring was noted. Assessment & Plan:    Encounter Diagnoses   Name Primary?  Primary osteoarthritis of right knee Yes    Primary osteoarthritis of left knee     Chronic pain of both knees        Orders Placed This Encounter   Procedures    20610 - [facility] DRAIN/INJECT LARGE JOINT/BURSA         Treatment Plan:  Treatment options were discussed with Kalyan Gael. We did once again review his updated plain films and his exam findings. He does have underlying significant medial compartment bilateral knee osteoarthritis and clinically at this time is doing reasonably well. Most of the time his pain is 1-2 out of 10 but at maximum he can only be a 2-3 out of 10 but he has been able to golf 4-5 days per week. He last completed viscosupplementation on 8/16/2021 but with his increasing achiness, we did inject both knees today using 2 cc of Celestone, 2 cc of Marcaine, 1 cc of Xylocaine. We had a very long 25 to 30-minute discussion today regarding long-term treatment options including knee replacement. Marquis Andujar He did have surgical consultation in December 2019 with Dr. Ingrid Barcenas who recommended that he continues with conservative treatment given his lack of high-grade limitations currently and lack of night pain and reasonable functionality. He may continue with his knee braces and his home-based exercise program as well as his meloxicam 15 mg daily. He is aware that he can have repeat viscosupplementation at this point. We will see him back in a few weeks to consider repeat viscosupplementation. He may continue with his treadmill and periodic biking. He does not really want to consider coollief radiofrequency ablation but he does not want to pursue this at this point.   We will see him back in a couple weeks for repeat evaluation and to consider viscosupplementation. He will contact us in the interim with questions or concerns. This dictation was performed with a verbal recognition program (DRAGON) and it was checked for errors. It is possible that there are still dictated errors within this office note. If so, please bring any errors to my attention for an addendum. All efforts were made to ensure that this office note is accurate.

## 2022-03-18 ENCOUNTER — OFFICE VISIT (OUTPATIENT)
Dept: ORTHOPEDIC SURGERY | Age: 72
End: 2022-03-18
Payer: MEDICARE

## 2022-03-18 DIAGNOSIS — M17.11 PRIMARY OSTEOARTHRITIS OF RIGHT KNEE: Primary | ICD-10-CM

## 2022-03-18 DIAGNOSIS — M17.12 PRIMARY OSTEOARTHRITIS OF LEFT KNEE: ICD-10-CM

## 2022-03-18 DIAGNOSIS — M25.561 CHRONIC PAIN OF BOTH KNEES: ICD-10-CM

## 2022-03-18 DIAGNOSIS — G89.29 CHRONIC PAIN OF BOTH KNEES: ICD-10-CM

## 2022-03-18 DIAGNOSIS — M25.562 CHRONIC PAIN OF BOTH KNEES: ICD-10-CM

## 2022-03-18 PROCEDURE — 20610 DRAIN/INJ JOINT/BURSA W/O US: CPT | Performed by: FAMILY MEDICINE

## 2022-03-18 RX ORDER — HYALURONATE SODIUM 10 MG/ML
40 SYRINGE (ML) INTRAARTICULAR ONCE
Status: COMPLETED | OUTPATIENT
Start: 2022-03-18 | End: 2022-03-18

## 2022-03-18 RX ADMIN — Medication 40 MG: at 10:37

## 2022-03-18 NOTE — PROGRESS NOTES
Chief Complaint  Knee Pain (EUFLEXXA #1 B KNEES)      Ciera Baugh is a 70 y.o. male who is a very pleasant retired white male who does golf on a near daily basis and is a patient of Dr. Unique Parks who had been followed over the last several years by myself and Dr. Bernstein Come for underlying significant medial compartment osteoarthritis with patellofemoral arthropathy who is being seen back today for recurrence of his knee pain. He last completed a round of bilateral knee Euflexxa on 12/23/2019 which did help him substantially up until early April 2020 there is no interim history of injury but has been golfing 4 to 5 days/week but it is not raining. Chloe Colder He does relate that while this is helped him in the past his last round of Visco supplementation is not nearly as effective as previous rounds have been. He is continue with his exercise program and does takes his his meloxicam fairly consistently. He does use knee sleeves which he got over-the-counter with golfing but typically when he golfs will come home and have to ice his knees. He is able to walk and perform most of his normal activities but typically will be sore post activity. Distance walking walking on uneven surfaces and stairclimbing can produce pain anywhere from a 2-5 out of 10. It is more achy in nature. He has been strongly considering the possibility of knee arthroplasty but is not having substantial night pain. His concern is that he is not getting \"any younger\" and does wish to continue with his normal golfing patterns. Denies locking catching or instability symptoms. He does have crepitation and popping. He does have episodic swelling. No true instability symptoms noted. He was last seen in the office on 4/27/2020 and was having significant discomfort in his knees bilaterally at that point.   With treatment he presents back today stating that he is doing much better would like to consider Visco supplementation once again which is helped him out substantially in the past.  He does not want to consider knee arthroplasty during golf season but may consider this during the winter. Denies locking catching or true instability symptoms. He does utilize his knee braces consistently with golfing as tolerated his meloxicam.  Is locking catching or instability symptoms. Denies rest or night pain. Less pain with stair climbing. He does golf several times per week and but usually uses a cart. Jaime Gardner was last seen in the office on 7/13/2020 and was continued on conservative treatment for his bilateral knee osteoarthritis. We completed Euflexxa to his knees bilaterally on 7/13/2020 and was continued on conservative treatment with his home-based exercise program and use of his knee brace. He did very well with viscosupplementation and continues to golf even in the winter several times per week. He has tolerated his meloxicam and does believe he had gotten benefit from receiving his viscosupplementation. He would like to try this again as he is hopeful to put off surgical intervention as much as possible. At most with his pain is only 1-2 out of 10 with distance walking and stair climbing. No night pain mechanical or instability symptoms noted. Jaime Gardner was last seen in the office on February 1, 2020 when we finished Euflexxa in his knees bilaterally. Once again he is known to have underlying bilateral knee medial compartment osteoarthritis which is significant with patellofemoral arthropathy and still is golfing at least 3 to 5 days/week. As he was doing well following his last visit, we talked about having him get back on the treadmill which he did do. Unfortunately he immediately started out walking 4 to 5 miles at a time at a neutral incline at about 2-1/2 mph and over time did aggravate his right knee anteriorly and medially. His left knee is doing reasonably well. He denies locking catching or true instability symptoms.   He denied definitive injury and does anticipate golfing 3 days/week at this point this coming week. Denies locking or catching. He has continued with his meloxicam and is requesting a steroid injection to his right knee. His left knee is currently stable. In the office on 3/8/2021 and was continued on conservative treatment for his significant bilateral knee osteoarthritis with patellofemoral arthropathy. He last completed viscosupplementation to his knees on 2/1/2021. He does get a little bit sore and achy but is still able to golf 4 to 5 days/week. Does use his knee sleeves and does work on his home-based exercise program.  Denies locking catching or true instability symptoms. His right knee still is more consistently painful. Denies locking and catching. He is continue with his meloxicam.  He would like to pursue viscosupplementation once again. We last saw Sade Newby in the office on 8/16/2021 when we finished up Euflexxa to his knees bilaterally. He is doing reasonably well particular with the left knee but does have achiness which he describes more as a throbbing up to about a 3 to 4-10 particularly if he does golf and then subsequently come home and do some yard work. He is continue with his meloxicam and does utilize his braces periodically. His left knee is doing reasonably well. He is not having substantial rest or night pain. He is questioning as to whether or not his last round of viscosupplementation was as effective as those were previously and has been contemplating knee replacement but still is able to walk and golf 4 to 5 days/week and is not having night pain. He is currently states that his knee pain symptoms bilaterally are tolerable. We last saw Sade Newby in the office on 10/6/2021 for his underlying significant bilateral knee osteoarthritis. He has become a little bit more sore and achy in his knees and is trying to work on a stretching and exercise program and does use his sleeves. His left knee once again is doing reasonably well but still has some soreness which he rates at 4-5 out of 10 on his right knee if he develops 4 to 5 days a week. Thankfully is not having great deal of rest or night pain and denies mechanical or instability symptoms. He is continue with his meloxicam.  He is trying to avoid knee surgery as long as he can and last finished up viscosupplementation on his knees on 8/16/2021. Meredith Horta was last seen in the office on 3/7/2022 was continued on conservative treatment for significant bilateral knee osteoarthritis. Overall he is doing much better and rates his improved about 60 to 70%. Is able to continue golfing and has been working on his exercise and stretching program.  He would like to pursue viscosupplementation once again as he is open avoid knee arthroplasty as long as he can. He once again is golfing 4 times per week and is no longer having substantial swelling. No rest or night pain. He is here today to consider repeat viscosupplementation which is helped him substantially in the past.  He is continue with his diclofenac and denies locking catching or true instability symptoms. Attest: I have reviewed and attest the documentation of the HPI documented by my . I will make any changes if necessary. Enc Date: 3/18/2022  Time: 12:16 PM  Provider: Shane Durand MD        Social History     Tobacco Use    Smoking status: Never Smoker    Smokeless tobacco: Never Used   Substance Use Topics    Alcohol use: Not on file    Drug use: Not on file        Review of Systems  Pertinent items are noted in HPI  Review of systems reviewed from Patient History Form dated on 12/4/2019 and available in the patient's chart under the Media tab. Vital Signs     There were no vitals taken for this visit.       General Exam:   Constitutional: Patient is adequately groomed with no evidence of malnutrition  DTRs: Deep tendon reflexes are intact  Mental Status: The patient is oriented to time, place and person. The patient's mood and affect are appropriate. Lymphatic: The lymphatic examination bilaterally reveals all areas to be without enlargement or induration. Vascular: Examination reveals no swelling or calf tenderness. Peripheral pulses are palpable and 2+. Neurological: The patient has good coordination. There is no weakness or sensory deficit. Knee Examination  Inspection:  There is no high-grade deformity. Does have some patellofemoral crepitation and trace knee joint effusions.     Palpation:  He does have only minimal tenderness over the medial and lateral patellofemoral facet on the right more so than left He does have a mildly Positive grind test on the right only. Some pain with medial joint line palpation but no high-grade meniscal clicks or so on the right.     Rang of Motion:  He is stiff with terminal 10° of extension with flexion to about 110 bilaterally. Hamstrings are mildly tight as is his right IT band.     Strength:  4+ out of 5 with knee flexion and extension.     Special Tests:  Less prominent pain with patellar grind testing in the right. Mild discomfort with crepitation with medial Scooter's on the right. No high-grade clicks. No high-grade instability. Hamstrings and IT band particularly on the right is tight. No palpable Baker's cyst or evidence of instability. Strength testing appears to relatively benign.     Skin: There are no rashes, ulcerations or lesions. Distal neurovascular exam is intact.     Gait: Fluid smooth gait     Reflex symmetrically preserved     Additional Comments:      Additional Examinations:  Right Lower Extremity: Examination of the right lower extremity does not show any tenderness, deformity or injury. Range of motion is unremarkable. There is no gross instability. There are no rashes, ulcerations or lesions.   Strength and tone are normal.  Left Lower Extremity: Examination of the left lower extremity does not show any tenderness, deformity or injury. Range of motion is unremarkable. There is no gross instability. There are no rashes, ulcerations or lesions. Strength and tone are normal.  Examination of the bilateral hip reveals intact skin. The patient demonstrates full painless range of motion with regards to flexion, abduction, internal and external rotation. There is not tenderness about the greater trochanter. There is a negative straight leg raise against resistance. Strength is 5/5 thorough out all planes.        Diagnostic Test Findings: Updated bilateral knee AP and PA weightbearing sunrise and lateral films were reviewed from 12/4/2019 and does show advanced bilateral knee medial compartment osteoarthritis with effective bone-on-bone changes to the medial compartments. At least moderate right greater than left patellofemoral arthropathy with spurring was noted. Assessment & Plan:    Encounter Diagnoses   Name Primary?  Primary osteoarthritis of right knee Yes    Primary osteoarthritis of left knee     Chronic pain of both knees        Orders Placed This Encounter   Procedures    CT ARTHROCENTESIS ASPIR&/INJ MAJOR JT/BURSA W/O US         Treatment Plan:  Treatment options were discussed with Dianna Borjas. We did once again review his updated plain films and his exam findings. He does have underlying significant medial compartment bilateral knee osteoarthritis and clinically at this time is doing reasonably well. Most of the time his pain is 1-2 out of 10 but at maximum he can only be a 2-3 out of 10 but he has been able to golf 4-5 days per week. He has improved about 60% over his last visit. After discussion of pros and cons of viscosupplementation, he did receive his first injection of Euflexxa to his knees bilaterally. This was performed using a standard prefilled 2 cc syringe.   He will continue his meloxicam 15 mg daily icing and activity modification with use of his knee sleeves. He previously had consultation with Dr. Christiano Branham who advised to put off knee arthroplasty as long as he can. He will contact us in the interim with questions or concerns. This dictation was performed with a verbal recognition program (DRAGON) and it was checked for errors. It is possible that there are still dictated errors within this office note. If so, please bring any errors to my attention for an addendum. All efforts were made to ensure that this office note is accurate.

## 2022-03-23 ENCOUNTER — OFFICE VISIT (OUTPATIENT)
Dept: ORTHOPEDIC SURGERY | Age: 72
End: 2022-03-23
Payer: MEDICARE

## 2022-03-23 DIAGNOSIS — M25.562 CHRONIC PAIN OF BOTH KNEES: ICD-10-CM

## 2022-03-23 DIAGNOSIS — M25.561 CHRONIC PAIN OF BOTH KNEES: ICD-10-CM

## 2022-03-23 DIAGNOSIS — M17.12 PRIMARY OSTEOARTHRITIS OF LEFT KNEE: ICD-10-CM

## 2022-03-23 DIAGNOSIS — M17.11 PRIMARY OSTEOARTHRITIS OF RIGHT KNEE: Primary | ICD-10-CM

## 2022-03-23 DIAGNOSIS — G89.29 CHRONIC PAIN OF BOTH KNEES: ICD-10-CM

## 2022-03-23 PROCEDURE — 99999 PR OFFICE/OUTPT VISIT,PROCEDURE ONLY: CPT | Performed by: FAMILY MEDICINE

## 2022-03-23 PROCEDURE — 20610 DRAIN/INJ JOINT/BURSA W/O US: CPT | Performed by: FAMILY MEDICINE

## 2022-03-23 RX ORDER — HYALURONATE SODIUM 10 MG/ML
40 SYRINGE (ML) INTRAARTICULAR ONCE
Status: COMPLETED | OUTPATIENT
Start: 2022-03-23 | End: 2022-03-23

## 2022-03-23 RX ADMIN — Medication 40 MG: at 10:31

## 2022-03-23 NOTE — PROGRESS NOTES
CC:  FU Knee Osteoarthritis with Viscosupplementation      Knee Pain (EUFLEXXA #1 B KNEES)      [place] is a 70 y.o. male who is a very pleasant retired white male who does golf on a near daily basis and is a patient of Dr. Javier Ibarra who had been followed over the last several years by myself and Dr. 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 He last completed a round of bilateral knee Euflexxa on 12/23/2019 which did help him substantially up until early April 2020 there is no interim history of injury but has been golfing 4 to 5 days/week but it is not raining. Eugene Neighbours He does relate that while this is helped him in the past his last round of Visco supplementation is not nearly as effective as previous rounds have been. He is continue with his exercise program and does takes his his meloxicam fairly consistently. He does use knee sleeves which he got over-the-counter with golfing but typically when he golfs will come home and have to ice his knees. He is able to walk and perform most of his normal activities but typically will be sore post activity. Distance walking walking on uneven surfaces and stairclimbing can produce pain anywhere from a 2-5 out of 10. It is more achy in nature. He has been strongly considering the possibility of knee arthroplasty but is not having substantial night pain. His concern is that he is not getting \"any younger\" and does wish to continue with his normal golfing patterns. Denies locking catching or instability symptoms. He does have crepitation and popping. He does have episodic swelling. No true instability symptoms noted. He was last seen in the office on 4/27/2020 and was having significant discomfort in his knees bilaterally at that point.   With treatment he presents back today stating that he is doing much better would like to consider Visco supplementation once again which is helped him out substantially in the past.  He does not want to consider knee arthroplasty during golf season but may consider this during the winter. Denies locking catching or true instability symptoms. He does utilize his knee braces consistently with golfing as tolerated his meloxicam.  Is locking catching or instability symptoms. Denies rest or night pain. Less pain with stair climbing. He does golf several times per week and but usually uses a cart. Tracey Jara was last seen in the office on 7/13/2020 and was continued on conservative treatment for his bilateral knee osteoarthritis. We completed Euflexxa to his knees bilaterally on 7/13/2020 and was continued on conservative treatment with his home-based exercise program and use of his knee brace. He did very well with viscosupplementation and continues to golf even in the winter several times per week. He has tolerated his meloxicam and does believe he had gotten benefit from receiving his viscosupplementation. He would like to try this again as he is hopeful to put off surgical intervention as much as possible. At most with his pain is only 1-2 out of 10 with distance walking and stair climbing. No night pain mechanical or instability symptoms noted. Tracey Jara was last seen in the office on February 1, 2020 when we finished Euflexxa in his knees bilaterally. Once again he is known to have underlying bilateral knee medial compartment osteoarthritis which is significant with patellofemoral arthropathy and still is golfing at least 3 to 5 days/week. As he was doing well following his last visit, we talked about having him get back on the treadmill which he did do. Unfortunately he immediately started out walking 4 to 5 miles at a time at a neutral incline at about 2-1/2 mph and over time did aggravate his right knee anteriorly and medially. His left knee is doing reasonably well.   He denies locking catching or true instability symptoms. He denied definitive injury and does anticipate golfing 3 days/week at this point this coming week. Denies locking or catching. He has continued with his meloxicam and is requesting a steroid injection to his right knee. His left knee is currently stable. In the office on 3/8/2021 and was continued on conservative treatment for his significant bilateral knee osteoarthritis with patellofemoral arthropathy. He last completed viscosupplementation to his knees on 2/1/2021. He does get a little bit sore and achy but is still able to golf 4 to 5 days/week. Does use his knee sleeves and does work on his home-based exercise program.  Denies locking catching or true instability symptoms. His right knee still is more consistently painful. Denies locking and catching. He is continue with his meloxicam.  He would like to pursue viscosupplementation once again. We last saw Jay Burciaga in the office on 8/16/2021 when we finished up Euflexxa to his knees bilaterally. He is doing reasonably well particular with the left knee but does have achiness which he describes more as a throbbing up to about a 3 to 4-10 particularly if he does golf and then subsequently come home and do some yard work. He is continue with his meloxicam and does utilize his braces periodically. His left knee is doing reasonably well. He is not having substantial rest or night pain. He is questioning as to whether or not his last round of viscosupplementation was as effective as those were previously and has been contemplating knee replacement but still is able to walk and golf 4 to 5 days/week and is not having night pain. He is currently states that his knee pain symptoms bilaterally are tolerable. We last saw Jay Burciaga in the office on 10/6/2021 for his underlying significant bilateral knee osteoarthritis.   He has become a little bit more sore and achy in his knees and is trying to work on a stretching and exercise program and does use his sleeves. His left knee once again is doing reasonably well but still has some soreness which he rates at 4-5 out of 10 on his right knee if he develops 4 to 5 days a week. Thankfully is not having great deal of rest or night pain and denies mechanical or instability symptoms. He is continue with his meloxicam.  He is trying to avoid knee surgery as long as he can and last finished up viscosupplementation on his knees on 8/16/2021. Jaime Gardner was last seen in the office on 3/7/2022 was continued on conservative treatment for significant bilateral knee osteoarthritis. Overall he is doing much better and rates his improved about 60 to 70%. Is able to continue golfing and has been working on his exercise and stretching program.  He would like to pursue viscosupplementation once again as he is open avoid knee arthroplasty as long as he can. He once again is golfing 4 times per week and is no longer having substantial swelling. No rest or night pain. He is here today to consider repeat viscosupplementation which is helped him substantially in the past.  He is continue with his diclofenac and denies locking catching or true instability symptoms. There is less seen in the office on 3/18/2022 we started Euflexxa on his knees bilaterally. He has noticed moderate improvement overall. He rates his improvement about 80%. He does continue to golf and has been working a stretching exercise program as well as use of his knee sleeve. He would like to avoid knee arthroplasty as long as he can and certainly would not do up for golf season. He does continue with his meloxicam denies locking catching or instability symptoms.   He is here today for his second Euflexxa injection bilaterally    PE: no substantial change in exam.    Assessment:  Knee Osteoarthritis with viscosupplementation      PROCEDURE NOTE:    PRE-PROCEDURE DIAGNOSIS: DJD knee    POST-PROCEDURE DIAGNOSIS: DJD knee    Injection #2 of Euflexxa. PROCEDURE:  With the patient's permission, his bilaterally knee was prepped in standard sterile fashion with Betadine and Alcohol and the prefilled 2cc injection of Euflexxa was injected intra-articularly into the bilaterally knee via a superolateral approach without difficulty. The patient tolerated this well without difficulty. A band-aid was applied. POST-PROCEDURE INSTRUCTIONS GIVEN TO PATIENT: The patient was advised to ice the knee for 15-20 minutes to relieve any injection site related pain. FOLLOW-UP: as directed. He will continue his meloxicam 15 mg daily use of his knee sleeve and his home-based exercise program.  We will see him back next week to finish up his Euflexxa injections. We did discuss potential timing of knee arthroplasty and he certainly would not do it prior to golf season but may consider this next winter.   He will contact us in the interim with questions or concerns

## 2022-03-28 ENCOUNTER — OFFICE VISIT (OUTPATIENT)
Dept: ORTHOPEDIC SURGERY | Age: 72
End: 2022-03-28
Payer: MEDICARE

## 2022-03-28 DIAGNOSIS — M17.11 PRIMARY OSTEOARTHRITIS OF RIGHT KNEE: Primary | ICD-10-CM

## 2022-03-28 DIAGNOSIS — M17.12 PRIMARY OSTEOARTHRITIS OF LEFT KNEE: ICD-10-CM

## 2022-03-28 DIAGNOSIS — M25.561 CHRONIC PAIN OF BOTH KNEES: ICD-10-CM

## 2022-03-28 DIAGNOSIS — G89.29 CHRONIC PAIN OF BOTH KNEES: ICD-10-CM

## 2022-03-28 DIAGNOSIS — M25.562 CHRONIC PAIN OF BOTH KNEES: ICD-10-CM

## 2022-03-28 PROCEDURE — 20610 DRAIN/INJ JOINT/BURSA W/O US: CPT | Performed by: FAMILY MEDICINE

## 2022-03-28 PROCEDURE — 99999 PR OFFICE/OUTPT VISIT,PROCEDURE ONLY: CPT | Performed by: FAMILY MEDICINE

## 2022-03-28 RX ORDER — HYALURONATE SODIUM 10 MG/ML
40 SYRINGE (ML) INTRAARTICULAR ONCE
Status: COMPLETED | OUTPATIENT
Start: 2022-03-28 | End: 2022-03-28

## 2022-03-28 RX ADMIN — Medication 40 MG: at 10:30

## 2022-03-28 NOTE — PROGRESS NOTES
CC:  FU Knee Osteoarthritis with Viscosupplementation      Knee Pain (EUFLEXXA #1 B KNEES)      Fly Durham is a 70 y.o. male who is a very pleasant retired white male who does golf on a near daily basis and is a patient of Dr. Elena Leslie who had been followed over the last several years by myself and Dr. Maxwell Bartholomew for underlying significant medial compartment osteoarthritis with patellofemoral arthropathy who is being seen back today for recurrence of his knee pain. He last completed a round of bilateral knee Euflexxa on 12/23/2019 which did help him substantially up until early April 2020 there is no interim history of injury but has been golfing 4 to 5 days/week but it is not raining. Jose Duvall He does relate that while this is helped him in the past his last round of Visco supplementation is not nearly as effective as previous rounds have been. He is continue with his exercise program and does takes his his meloxicam fairly consistently. He does use knee sleeves which he got over-the-counter with golfing but typically when he golfs will come home and have to ice his knees. He is able to walk and perform most of his normal activities but typically will be sore post activity. Distance walking walking on uneven surfaces and stairclimbing can produce pain anywhere from a 2-5 out of 10. It is more achy in nature. He has been strongly considering the possibility of knee arthroplasty but is not having substantial night pain. His concern is that he is not getting \"any younger\" and does wish to continue with his normal golfing patterns. Denies locking catching or instability symptoms. He does have crepitation and popping. He does have episodic swelling. No true instability symptoms noted. He was last seen in the office on 4/27/2020 and was having significant discomfort in his knees bilaterally at that point.   With treatment he presents back today stating that he is doing much better would like to consider Visco supplementation once again which is helped him out substantially in the past.  He does not want to consider knee arthroplasty during golf season but may consider this during the winter. Denies locking catching or true instability symptoms. He does utilize his knee braces consistently with golfing as tolerated his meloxicam.  Is locking catching or instability symptoms. Denies rest or night pain. Less pain with stair climbing. He does golf several times per week and but usually uses a cart. John Hernandez was last seen in the office on 7/13/2020 and was continued on conservative treatment for his bilateral knee osteoarthritis. We completed Euflexxa to his knees bilaterally on 7/13/2020 and was continued on conservative treatment with his home-based exercise program and use of his knee brace. He did very well with viscosupplementation and continues to golf even in the winter several times per week. He has tolerated his meloxicam and does believe he had gotten benefit from receiving his viscosupplementation. He would like to try this again as he is hopeful to put off surgical intervention as much as possible. At most with his pain is only 1-2 out of 10 with distance walking and stair climbing. No night pain mechanical or instability symptoms noted. John Hernandez was last seen in the office on February 1, 2020 when we finished Euflexxa in his knees bilaterally. Once again he is known to have underlying bilateral knee medial compartment osteoarthritis which is significant with patellofemoral arthropathy and still is golfing at least 3 to 5 days/week. As he was doing well following his last visit, we talked about having him get back on the treadmill which he did do. Unfortunately he immediately started out walking 4 to 5 miles at a time at a neutral incline at about 2-1/2 mph and over time did aggravate his right knee anteriorly and medially. His left knee is doing reasonably well.   He denies locking catching or true instability symptoms. He denied definitive injury and does anticipate golfing 3 days/week at this point this coming week. Denies locking or catching. He has continued with his meloxicam and is requesting a steroid injection to his right knee. His left knee is currently stable. In the office on 3/8/2021 and was continued on conservative treatment for his significant bilateral knee osteoarthritis with patellofemoral arthropathy. He last completed viscosupplementation to his knees on 2/1/2021. He does get a little bit sore and achy but is still able to golf 4 to 5 days/week. Does use his knee sleeves and does work on his home-based exercise program.  Denies locking catching or true instability symptoms. His right knee still is more consistently painful. Denies locking and catching. He is continue with his meloxicam.  He would like to pursue viscosupplementation once again. We last saw Deloris Curry in the office on 8/16/2021 when we finished up Euflexxa to his knees bilaterally. He is doing reasonably well particular with the left knee but does have achiness which he describes more as a throbbing up to about a 3 to 4-10 particularly if he does golf and then subsequently come home and do some yard work. He is continue with his meloxicam and does utilize his braces periodically. His left knee is doing reasonably well. He is not having substantial rest or night pain. He is questioning as to whether or not his last round of viscosupplementation was as effective as those were previously and has been contemplating knee replacement but still is able to walk and golf 4 to 5 days/week and is not having night pain. He is currently states that his knee pain symptoms bilaterally are tolerable. We last saw Deloris Curry in the office on 10/6/2021 for his underlying significant bilateral knee osteoarthritis.   He has become a little bit more sore and achy in his knees and is trying to work on a stretching and exercise program and does use his sleeves. His left knee once again is doing reasonably well but still has some soreness which he rates at 4-5 out of 10 on his right knee if he develops 4 to 5 days a week. Thankfully is not having great deal of rest or night pain and denies mechanical or instability symptoms. He is continue with his meloxicam.  He is trying to avoid knee surgery as long as he can and last finished up viscosupplementation on his knees on 8/16/2021. Erwin Davis was last seen in the office on 3/7/2022 was continued on conservative treatment for significant bilateral knee osteoarthritis. Overall he is doing much better and rates his improved about 60 to 70%. Is able to continue golfing and has been working on his exercise and stretching program.  He would like to pursue viscosupplementation once again as he is open avoid knee arthroplasty as long as he can. He once again is golfing 4 times per week and is no longer having substantial swelling. No rest or night pain. He is here today to consider repeat viscosupplementation which is helped him substantially in the past.  He is continue with his diclofenac and denies locking catching or true instability symptoms. There is less seen in the office on 3/18/2022 we started Euflexxa on his knees bilaterally. He has noticed moderate improvement overall. He rates his improvement about 80%. He does continue to golf and has been working a stretching exercise program as well as use of his knee sleeve. He would like to avoid knee arthroplasty as long as he can and certainly would not do up for golf season. He does continue with his meloxicam denies locking catching or instability symptoms. He is here today for his second Euflexxa injection bilaterally. Erwin Davis was last seen in the office on 3/23/2022 and was continued on viscosupplementation once again for his knees. He once again is improved substantially.   He is 85% better but does continue to golf and has been more compliant with his stretching exercise program.  He does utilize his knee sleeves which helps dramatically with coughing. He is here today for his final Euflexxa injection is typically done very well with this. He is continue with his stretches and meloxicam and denies mechanical locking catching or true instability symptoms. He is hoping to put off knee arthroplasty as long as he can. PE: no substantial change in exam.    Assessment:  Knee Osteoarthritis with viscosupplementation      PROCEDURE NOTE:    PRE-PROCEDURE DIAGNOSIS: DJD knee    POST-PROCEDURE DIAGNOSIS: DJD knee    Injection #3 of Euflexxa. PROCEDURE:  With the patient's permission, his bilaterally knee was prepped in standard sterile fashion with Betadine and Alcohol and the prefilled 2cc injection of Euflexxa was injected intra-articularly into the bilaterally knee via a superolateral approach without difficulty. The patient tolerated this well without difficulty. A band-aid was applied. POST-PROCEDURE INSTRUCTIONS GIVEN TO PATIENT: The patient was advised to ice the knee for 15-20 minutes to relieve any injection site related pain. FOLLOW-UP: as directed. He will continue his meloxicam 15 mg daily use of his knee sleeve and his home-based exercise program.  He is aware that he can have repeat rounds of viscosupplementation 6-month intervals or interim steroid injections. We have discussed multiple times in the past knee arthroplasty and he would not certainly do this prior to the onset of golf season but may consider next winter. Icing and activity modification importance of continue with his exercise program was discussed. He will contact us in the interim with questions or concerns.

## 2022-06-08 ENCOUNTER — OFFICE VISIT (OUTPATIENT)
Dept: ORTHOPEDIC SURGERY | Age: 72
End: 2022-06-08
Payer: MEDICARE

## 2022-06-08 VITALS — WEIGHT: 219 LBS | BODY MASS INDEX: 30.66 KG/M2 | HEIGHT: 71 IN

## 2022-06-08 DIAGNOSIS — M17.11 PRIMARY OSTEOARTHRITIS OF RIGHT KNEE: Primary | ICD-10-CM

## 2022-06-08 DIAGNOSIS — M25.562 CHRONIC PAIN OF BOTH KNEES: ICD-10-CM

## 2022-06-08 DIAGNOSIS — M25.561 CHRONIC PAIN OF BOTH KNEES: ICD-10-CM

## 2022-06-08 DIAGNOSIS — G89.29 CHRONIC PAIN OF BOTH KNEES: ICD-10-CM

## 2022-06-08 DIAGNOSIS — M17.12 PRIMARY OSTEOARTHRITIS OF LEFT KNEE: ICD-10-CM

## 2022-06-08 PROCEDURE — 20610 DRAIN/INJ JOINT/BURSA W/O US: CPT | Performed by: FAMILY MEDICINE

## 2022-06-08 RX ORDER — BETAMETHASONE SODIUM PHOSPHATE AND BETAMETHASONE ACETATE 3; 3 MG/ML; MG/ML
24 INJECTION, SUSPENSION INTRA-ARTICULAR; INTRALESIONAL; INTRAMUSCULAR; SOFT TISSUE ONCE
Status: COMPLETED | OUTPATIENT
Start: 2022-06-08 | End: 2022-06-08

## 2022-06-08 RX ORDER — BUPIVACAINE HYDROCHLORIDE 2.5 MG/ML
4 INJECTION, SOLUTION INFILTRATION; PERINEURAL ONCE
Status: COMPLETED | OUTPATIENT
Start: 2022-06-08 | End: 2022-06-08

## 2022-06-08 RX ORDER — LIDOCAINE HYDROCHLORIDE 10 MG/ML
2 INJECTION, SOLUTION INFILTRATION; PERINEURAL ONCE
Status: COMPLETED | OUTPATIENT
Start: 2022-06-08 | End: 2022-06-08

## 2022-06-08 RX ADMIN — LIDOCAINE HYDROCHLORIDE 2 ML: 10 INJECTION, SOLUTION INFILTRATION; PERINEURAL at 09:30

## 2022-06-08 RX ADMIN — BETAMETHASONE SODIUM PHOSPHATE AND BETAMETHASONE ACETATE 24 MG: 3; 3 INJECTION, SUSPENSION INTRA-ARTICULAR; INTRALESIONAL; INTRAMUSCULAR; SOFT TISSUE at 09:29

## 2022-06-08 RX ADMIN — BUPIVACAINE HYDROCHLORIDE 10 MG: 2.5 INJECTION, SOLUTION INFILTRATION; PERINEURAL at 09:30

## 2022-06-08 NOTE — PROGRESS NOTES
Chief Complaint  Knee Pain (CK BILATERAL KNEES)      Vonnie Pickard is a 67 y.o. male who is a very pleasant retired white male who does golf on a near daily basis and is a patient of Dr. Christiano Rose who had been followed over the last several years by myself and Dr. Enoc Murillo for underlying significant medial compartment osteoarthritis with patellofemoral arthropathy who is being seen back today for recurrence of his knee pain. He last completed a round of bilateral knee Euflexxa on 3/28/2022 which did help him substantially up until early to mid May 2022 there is no interim history of injury but has been golfing 4 to 5 days/week but it is not raining. Lastrup Bound He does relate that while this is helped him in the past his last round of Visco supplementation is not nearly as effective as previous rounds have been. He is continue with his exercise program and does takes his his meloxicam fairly consistently. He does use knee sleeves which he got over-the-counter with golfing but typically when he golfs will come home and have to ice his knees. He is able to walk and perform most of his normal activities but typically will be sore post activity. Distance walking walking on uneven surfaces and stairclimbing can produce pain anywhere from a 2-5 out of 10. It is more achy in nature. He has been strongly considering the possibility of knee arthroplasty but is not having substantial night pain. His concern is that he is not getting \"any younger\" and does wish to continue with his normal golfing patterns. Denies locking catching or instability symptoms. He does have crepitation and popping. He does have episodic swelling. No true instability symptoms noted. We last saw Juliana in the office on 3/28/2022 when we finished up his viscosupplementation to his knees bilaterally with Euflexxa.   He was doing reasonably well but has noticed over the last couple weeks increasing pain after playing 2 consecutive days of 18 holes along with yard work. He does not recall a specific injury or fall but his right knee in particular is been bothering him substantially. He is more achy in nature but will have sharp pain with positional changes and walking. He does have crepitation and popping but no active locking or catching. He has noticed some mild swelling and has been taking his meloxicam fairly consistently. He would like to avoid knee arthroplasty as long as he can and we did talk to him about potential geniculate nerve ablation. For the most part his pain ranges between 2-4 out of 10 and is not having consistent night pain. Attest: I have reviewed and attest the documentation of the HPI documented by my . I will make any changes if necessary. Enc Date: 6/8/2022  Time: 10:35 AM  Provider: Elijah Jerez MD        Social History     Tobacco Use    Smoking status: Never Smoker    Smokeless tobacco: Never Used   Substance Use Topics    Alcohol use: Not on file    Drug use: Not on file        Review of Systems  Pertinent items are noted in HPI  Review of systems reviewed from Patient History Form dated on 12/4/2019 and available in the patient's chart under the Media tab. Vital Signs     Ht 5' 10.5\" (1.791 m)   Wt 219 lb (99.3 kg)   BMI 30.98 kg/m²       General Exam:   Constitutional: Patient is adequately groomed with no evidence of malnutrition  DTRs: Deep tendon reflexes are intact  Mental Status: The patient is oriented to time, place and person. The patient's mood and affect are appropriate. Lymphatic: The lymphatic examination bilaterally reveals all areas to be without enlargement or induration. Vascular: Examination reveals no swelling or calf tenderness. Peripheral pulses are palpable and 2+. Neurological: The patient has good coordination. There is no weakness or sensory deficit. Knee Examination  Inspection:  There is no high-grade deformity.   Does have some patellofemoral crepitation and trace knee joint effusions.     Palpation:  He does have more mild to moderate tenderness over the medial and lateral patellofemoral facet on the right more so than left He does have a mildly moderately positive grind test on the right only. Some pain with medial joint line palpation but no high-grade meniscal clicks or so on the right.     Rang of Motion:  He is stiff with terminal 10° of extension with flexion to about 110 bilaterally. Hamstrings are mildly tight as is his right IT band.     Strength:  4+ out of 5 with knee flexion and extension.     Special Tests:  Mild to moderate pain with patellar grind testing in the right. Mild discomfort with crepitation with medial Scooter's on the right. No high-grade clicks. No high-grade instability. Hamstrings and IT band particularly on the right is tight. No palpable Baker's cyst or evidence of instability. Strength testing appears to relatively benign.     Skin: There are no rashes, ulcerations or lesions. Distal neurovascular exam is intact.     Gait: Fluid smooth gait     Reflex symmetrically preserved     Additional Comments:      Additional Examinations:  Right Lower Extremity: Examination of the right lower extremity does not show any tenderness, deformity or injury. Range of motion is unremarkable. There is no gross instability. There are no rashes, ulcerations or lesions. Strength and tone are normal.  Left Lower Extremity: Examination of the left lower extremity does not show any tenderness, deformity or injury. Range of motion is unremarkable. There is no gross instability. There are no rashes, ulcerations or lesions. Strength and tone are normal.  Examination of the bilateral hip reveals intact skin. The patient demonstrates full painless range of motion with regards to flexion, abduction, internal and external rotation. There is not tenderness about the greater trochanter.   There is a negative straight leg raise against resistance. Strength is 5/5 thorough out all planes.        Diagnostic Test Findings: Updated bilateral knee AP and PA weightbearing sunrise and lateral films were reviewed from 12/4/2019 and does show advanced bilateral knee medial compartment osteoarthritis with effective bone-on-bone changes to the medial compartments. At least moderate right greater than left patellofemoral arthropathy with spurring was noted. Assessment & Plan:    Encounter Diagnoses   Name Primary?  Primary osteoarthritis of right knee Yes    Primary osteoarthritis of left knee     Chronic pain of both knees        Orders Placed This Encounter   Procedures    58387 - SC DRAIN/INJECT LARGE JOINT/BURSA         Treatment Plan:  Treatment options were discussed with Darci Goode. We did once again review his updated plain films and his exam findings. He does have underlying significant medial compartment bilateral knee osteoarthritis and clinically at this time he has become a little bit more symptomatic after playing 18 holes 2 separate days in a row combined with yard work. He just completed viscosupplementation on 3/20/2022 and due to his recent worsening of pain, we did inject his knees bilaterally today using 2 cc of Celestone, 2 cc of Marcaine, 1 cc of Xylocaine. We did previously discussed viscosupplementation 6-month intervals but also genicular nerve ablation and/or knee arthroplasty which I think he would do quite well with but is concerned about the postoperative rehab and certainly would not consider this in the middle of summer. He previously had surgical consultation with Dr. Jos Collins in 2018 who encouraged him to continue with conservative treatment.   He will continue with his meloxicam 15 mg daily and we will plan on seeing him back as long as he does well at the end of September 2022 to repeat his viscosupplementation although we may consider a trial of Stephanie Paulino which hopefully will give him more longstanding pain relief in between his viscosupplementation series. He will contact us in the interim with questions or concerns. This dictation was performed with a verbal recognition program (DRAGON) and it was checked for errors. It is possible that there are still dictated errors within this office note. If so, please bring any errors to my attention for an addendum. All efforts were made to ensure that this office note is accurate.

## 2022-07-26 DIAGNOSIS — G89.29 CHRONIC PAIN OF BOTH KNEES: ICD-10-CM

## 2022-07-26 DIAGNOSIS — M25.562 CHRONIC PAIN OF BOTH KNEES: ICD-10-CM

## 2022-07-26 DIAGNOSIS — M17.12 PRIMARY OSTEOARTHRITIS OF LEFT KNEE: ICD-10-CM

## 2022-07-26 DIAGNOSIS — M17.11 PRIMARY OSTEOARTHRITIS OF RIGHT KNEE: Primary | ICD-10-CM

## 2022-07-26 DIAGNOSIS — M25.561 CHRONIC PAIN OF BOTH KNEES: ICD-10-CM

## 2022-08-01 ENCOUNTER — OFFICE VISIT (OUTPATIENT)
Dept: ORTHOPEDIC SURGERY | Age: 72
End: 2022-08-01
Payer: MEDICARE

## 2022-08-01 DIAGNOSIS — M17.12 PRIMARY OSTEOARTHRITIS OF LEFT KNEE: ICD-10-CM

## 2022-08-01 DIAGNOSIS — G89.29 CHRONIC PAIN OF BOTH KNEES: ICD-10-CM

## 2022-08-01 DIAGNOSIS — M25.562 CHRONIC PAIN OF BOTH KNEES: ICD-10-CM

## 2022-08-01 DIAGNOSIS — M17.11 PRIMARY OSTEOARTHRITIS OF RIGHT KNEE: Primary | ICD-10-CM

## 2022-08-01 DIAGNOSIS — M25.561 CHRONIC PAIN OF BOTH KNEES: ICD-10-CM

## 2022-08-01 PROCEDURE — 99213 OFFICE O/P EST LOW 20 MIN: CPT | Performed by: FAMILY MEDICINE

## 2022-08-01 PROCEDURE — 1036F TOBACCO NON-USER: CPT | Performed by: FAMILY MEDICINE

## 2022-08-01 PROCEDURE — G8427 DOCREV CUR MEDS BY ELIG CLIN: HCPCS | Performed by: FAMILY MEDICINE

## 2022-08-01 PROCEDURE — 1123F ACP DISCUSS/DSCN MKR DOCD: CPT | Performed by: FAMILY MEDICINE

## 2022-08-01 PROCEDURE — 3017F COLORECTAL CA SCREEN DOC REV: CPT | Performed by: FAMILY MEDICINE

## 2022-08-01 PROCEDURE — G8417 CALC BMI ABV UP PARAM F/U: HCPCS | Performed by: FAMILY MEDICINE

## 2022-08-01 PROCEDURE — 20610 DRAIN/INJ JOINT/BURSA W/O US: CPT | Performed by: FAMILY MEDICINE

## 2022-08-01 NOTE — LETTER
[address] [place] [address] [facility] [id number]  Phone: [phone]  Fax: [phone]    Janet Cardenas MD        August 1, 2022    To Whom it May Concern,    Please allow [employer] to use a golf cart handicap flag indefinitely secondary to ongoing orthopaedic issue.         Sincerely,        Janet Cardenas MD

## 2022-08-01 NOTE — PROGRESS NOTES
Chief Complaint  Knee Pain (CK [facility] KNEES/ Lars Han )      Marcelino Walton is a 67 y.o. male who is a very pleasant retired white male who does golf on a near daily basis and is a patient of Dr. Valencia Curran who had been followed over the last several years by myself and Dr. 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 He last completed a round of bilateral knee Euflexxa on 3/28/2022 which did help him substantially up until early to mid May 2022 there is no interim history of injury but has been golfing 4 to 5 days/week but it is not raining. Annmarie Ulloa He does relate that while this is helped him in the past his last round of Visco supplementation is not nearly as effective as previous rounds have been. He is continue with his exercise program and does takes his his meloxicam fairly consistently. He does use knee sleeves which he got over-the-counter with golfing but typically when he golfs will come home and have to ice his knees. He is able to walk and perform most of his normal activities but typically will be sore post activity. Distance walking walking on uneven surfaces and stairclimbing can produce pain anywhere from a 2-5 out of 10. It is more achy in nature. He has been strongly considering the possibility of knee arthroplasty but is not having substantial night pain. His concern is that he is not getting \"any younger\" and does wish to continue with his normal golfing patterns. Denies locking catching or instability symptoms. He does have crepitation and popping. He does have episodic swelling. No true instability symptoms noted. We last saw Ruth Morales in the office on 3/28/2022 when we finished up his viscosupplementation to his knees bilaterally with Euflexxa.   He was doing reasonably well but has noticed over the last couple weeks increasing pain after playing 2 consecutive days of 18 holes along with yard work. He does not recall a specific injury or fall but his right knee in particular is been bothering him substantially. He is more achy in nature but will have sharp pain with positional changes and walking. He does have crepitation and popping but no active locking or catching. He has noticed some mild swelling and has been taking his meloxicam fairly consistently. He would like to avoid knee arthroplasty as long as he can and we did talk to him about potential geniculate nerve ablation. For the most part his pain ranges between 2-4 out of 10 and is not having consistent night pain. We last saw Miller Morales in the office on 6/8/2022 for his significant bilateral knee osteoarthritis with patellofemoral neuropathy. He has been doing reasonably well but has become a little bit more sore with frequent coughing throughout the summer to the point where he is having about 5 out of 10 pain anteriorly and medially on the right and only about a 1-2 out of 10 on the left. He has noticed that his typical steroid injections have not been as effective and he last completed viscosupplementation to his knees on 3/28/2022. He would like to try Joe Barron. He has been utilizing his knee sleeves and admits he could be a little bit better and performing his home-based exercises. Thankfully is not complaining of a great deal of rest or night pain but distance walking going up and down stairs if he sits too long and subsequent changes positions is still problematic more so on the right. Denies true locking catching or instability symptoms. Attest: I have reviewed and attest the documentation of the HPI documented by my . I will make any changes if necessary.      Enc Date: 8/1/2022  Time: 8:50 AM  Provider: Christopher Vasquez MD        Social History     Tobacco Use    Smoking status: Never    Smokeless tobacco: Never        Review of Systems  Pertinent items are noted in HPI  Review of systems reviewed from Patient History Form dated on 12/4/2019 and available in the patient's chart under the Media tab. Vital Signs     There were no vitals taken for this visit. General Exam:   Constitutional: Patient is adequately groomed with no evidence of malnutrition  DTRs: Deep tendon reflexes are intact  Mental Status: The patient is oriented to time, place and person. The patient's mood and affect are appropriate. Lymphatic: The lymphatic examination bilaterally reveals all areas to be without enlargement or induration. Vascular: Examination reveals no swelling or calf tenderness. Peripheral pulses are palpable and 2+. Neurological: The patient has good coordination. There is no weakness or sensory deficit. Knee Examination  Inspection:  There is no high-grade deformity. Does have some patellofemoral crepitation and trace knee joint effusions. Palpation:  He does have more mild to moderate tenderness over the medial and lateral patellofemoral facet on the right more so than left He does have a mildly moderately positive grind test on the right only. Some pain with medial joint line palpation but no high-grade meniscal clicks or so on the right. Rang of Motion:  He is stiff with terminal 10° of extension with flexion to about 110 bilaterally. Hamstrings are mildly tight as is his right IT band. Strength:  4+ out of 5 with knee flexion and extension. Special Tests:  Mild to moderate pain with patellar grind testing in the right. Mild discomfort with crepitation with medial Scooter's on the right. No high-grade clicks. No high-grade instability. Hamstrings and IT band particularly on the right is tight. No palpable Baker's cyst or evidence of instability. Strength testing appears to relatively benign. Skin: There are no rashes, ulcerations or lesions. Distal neurovascular exam is intact.      Gait: Fluid smooth gait     Reflex symmetrically preserved Additional Comments:      Additional Examinations:  Right Lower Extremity: Examination of the right lower extremity does not show any tenderness, deformity or injury. Range of motion is unremarkable. There is no gross instability. There are no rashes, ulcerations or lesions. Strength and tone are normal.  Left Lower Extremity: Examination of the left lower extremity does not show any tenderness, deformity or injury. Range of motion is unremarkable. There is no gross instability. There are no rashes, ulcerations or lesions. Strength and tone are normal.  Examination of the bilateral hip reveals intact skin. The patient demonstrates full painless range of motion with regards to flexion, abduction, internal and external rotation. There is not tenderness about the greater trochanter. There is a negative straight leg raise against resistance. Strength is 5/5 thorough out all planes. Diagnostic Test Findings: Updated bilateral knee AP and PA weightbearing sunrise and lateral films were reviewed from 12/4/2019 and does show advanced bilateral knee medial compartment osteoarthritis with effective bone-on-bone changes to the medial compartments. At least moderate right greater than left patellofemoral arthropathy with spurring was noted. Assessment & Plan:    Encounter Diagnoses   Name Primary? Primary osteoarthritis of right knee Yes    Primary osteoarthritis of left knee     Chronic pain of both knees        Orders Placed This Encounter   Procedures    70001 - [facility] DRAIN/INJECT LARGE JOINT/BURSA         Treatment Plan:  Treatment options were discussed with Snehal Blanc. We did once again review his updated plain films and his exam findings. He does have underlying significant medial compartment bilateral knee osteoarthritis and clinically at this time he has become a little bit more symptomatic after playing 18 holes 2 separate days in a row combined with yard work.   Rates pain about a 5 out of 10 on the right and about a 1-2 out of 10 on the left. He just completed viscosupplementation on 3/28/2022 and due to his recent worsening of pain, we did inject his knees bilaterally today using Zilretta 32 mg to each knee. We did previously discussed viscosupplementation 6-month intervals but also genicular nerve ablation and/or knee arthroplasty which I think he would do quite well with but is concerned about the postoperative rehab and certainly would not consider this in the middle of summer. He previously had surgical consultation with Dr. Giovanni Gonzalez in 2018 who encouraged him to continue with conservative treatment. He will continue with his meloxicam 15 mg daily and we will plan on seeing him back as long as he does well at the end of September 2022 to repeat his viscosupplementation. He will contact us in the interim with questions or concerns. This dictation was performed with a verbal recognition program (DRAGON) and it was checked for errors. It is possible that there are still dictated errors within this office note. If so, please bring any errors to my attention for an addendum. All efforts were made to ensure that this office note is accurate.

## 2022-10-12 ENCOUNTER — OFFICE VISIT (OUTPATIENT)
Dept: ORTHOPEDIC SURGERY | Age: 72
End: 2022-10-12
Payer: MEDICARE

## 2022-10-12 VITALS — WEIGHT: 219 LBS | HEIGHT: 71 IN | BODY MASS INDEX: 30.66 KG/M2

## 2022-10-12 DIAGNOSIS — M25.561 CHRONIC PAIN OF BOTH KNEES: ICD-10-CM

## 2022-10-12 DIAGNOSIS — M17.12 PRIMARY OSTEOARTHRITIS OF LEFT KNEE: ICD-10-CM

## 2022-10-12 DIAGNOSIS — M25.562 CHRONIC PAIN OF BOTH KNEES: ICD-10-CM

## 2022-10-12 DIAGNOSIS — M17.11 PRIMARY OSTEOARTHRITIS OF RIGHT KNEE: Primary | ICD-10-CM

## 2022-10-12 DIAGNOSIS — G89.29 CHRONIC PAIN OF BOTH KNEES: ICD-10-CM

## 2022-10-12 PROCEDURE — 20610 DRAIN/INJ JOINT/BURSA W/O US: CPT | Performed by: FAMILY MEDICINE

## 2022-10-12 RX ORDER — HYALURONATE SODIUM 10 MG/ML
40 SYRINGE (ML) INTRAARTICULAR ONCE
Status: COMPLETED | OUTPATIENT
Start: 2022-10-12 | End: 2022-10-12

## 2022-10-12 RX ADMIN — Medication 40 MG: at 09:47

## 2022-10-12 NOTE — PROGRESS NOTES
Chief Complaint  Knee Pain (EUFLEXXA #1 BILATERAL KNEES/)      Yissel Kendrick is a 67 y.o. male who is a very pleasant retired white male who does golf on a near daily basis and is a patient of Dr. Jacob Golden who had been followed over the last several years by myself and Dr. 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 He last completed a round of bilateral knee Euflexxa on 3/28/2022 which did help him substantially up until early to mid May 2022 there is no interim history of injury but has been golfing 4 to 5 days/week but it is not raining. Duane Colon He does relate that while this is helped him in the past his last round of Visco supplementation is not nearly as effective as previous rounds have been. He is continue with his exercise program and does takes his his meloxicam fairly consistently. He does use knee sleeves which he got over-the-counter with golfing but typically when he golfs will come home and have to ice his knees. He is able to walk and perform most of his normal activities but typically will be sore post activity. Distance walking walking on uneven surfaces and stairclimbing can produce pain anywhere from a 2-5 out of 10. It is more achy in nature. He has been strongly considering the possibility of knee arthroplasty but is not having substantial night pain. His concern is that he is not getting \"any younger\" and does wish to continue with his normal golfing patterns. Denies locking catching or instability symptoms. He does have crepitation and popping. He does have episodic swelling. No true instability symptoms noted. We last saw Veronica Hernandez in the office on 3/28/2022 when we finished up his viscosupplementation to his knees bilaterally with Euflexxa.   He was doing reasonably well but has noticed over the last couple weeks increasing pain after playing 2 consecutive days of 18 holes along with yard work. He does not recall a specific injury or fall but his right knee in particular is been bothering him substantially. He is more achy in nature but will have sharp pain with positional changes and walking. He does have crepitation and popping but no active locking or catching. He has noticed some mild swelling and has been taking his meloxicam fairly consistently. He would like to avoid knee arthroplasty as long as he can and we did talk to him about potential geniculate nerve ablation. For the most part his pain ranges between 2-4 out of 10 and is not having consistent night pain. We last saw Donita Dalal in the office on 6/8/2022 for his significant bilateral knee osteoarthritis with patellofemoral neuropathy. He has been doing reasonably well but has become a little bit more sore with frequent coughing throughout the summer to the point where he is having about 5 out of 10 pain anteriorly and medially on the right and only about a 1-2 out of 10 on the left. He has noticed that his typical steroid injections have not been as effective and he last completed viscosupplementation to his knees on 3/28/2022. He would like to try Mukund Birch. He has been utilizing his knee sleeves and admits he could be a little bit better and performing his home-based exercises. Thankfully is not complaining of a great deal of rest or night pain but distance walking going up and down stairs if he sits too long and subsequent changes positions is still problematic more so on the right. Denies true locking catching or instability symptoms. With I saw Donita Dalal in the office on 8/1/2022 when we attempted Mukund Birch to his knees. He does not believe this worked any more effectively than steroid injections and is having pain anteriorly and medially more so on the right than left.   Denies locking or catching is trying to work on her exercises as utilize his sleeves and will have soreness if he attempts to golf multiple days in a row. He is thankfully not having a great deal of rest or night pain. He still has discomfort with positional changes and stair climbing as well as distance walking. He last completed viscosupplementation to his knees on 3/28/2022 and still is hopeful to put off knee arthroplasty although he has been thinking a lot more about this particular to his right knee. Attest: I have reviewed and attest the documentation of the HPI documented by my . I will make any changes if necessary. Enc Date: 10/12/2022  Time: 12:25 PM  Provider: Phyllis Stanton MD        Social History     Tobacco Use    Smoking status: Never    Smokeless tobacco: Never        Review of Systems  Pertinent items are noted in HPI  Review of systems reviewed from Patient History Form dated on 12/4/2019 and available in the patient's chart under the Media tab. Vital Signs     Ht 5' 10.5\" (1.791 m)   Wt 219 lb (99.3 kg)   BMI 30.98 kg/m²       General Exam:   Constitutional: Patient is adequately groomed with no evidence of malnutrition  DTRs: Deep tendon reflexes are intact  Mental Status: The patient is oriented to time, place and person. The patient's mood and affect are appropriate. Lymphatic: The lymphatic examination bilaterally reveals all areas to be without enlargement or induration. Vascular: Examination reveals no swelling or calf tenderness. Peripheral pulses are palpable and 2+. Neurological: The patient has good coordination. There is no weakness or sensory deficit. Knee Examination  Inspection:  There is no high-grade deformity. Does have some patellofemoral crepitation and trace knee joint effusions. Palpation:  He does have more mild to moderate tenderness over the medial and lateral patellofemoral facet on the right more so than left He does have a mildly moderately positive grind test on the right only.   Some pain with medial joint line palpation but no high-grade meniscal clicks or so on the right. Rang of Motion:  He is stiff with terminal 10° of extension with flexion to about 110 bilaterally. Hamstrings are mildly tight as is his right IT band. Strength:  4+ out of 5 with knee flexion and extension. Special Tests:  Mild to moderate pain with patellar grind testing in the right. Mild discomfort with crepitation with medial Scooter's on the right. No high-grade clicks. No high-grade instability. Hamstrings and IT band particularly on the right is tight. No palpable Baker's cyst or evidence of instability. Strength testing appears to relatively benign. Skin: There are no rashes, ulcerations or lesions. Distal neurovascular exam is intact. Gait: Fluid smooth gait     Reflex symmetrically preserved     Additional Comments:      Additional Examinations:  Right Lower Extremity: Examination of the right lower extremity does not show any tenderness, deformity or injury. Range of motion is unremarkable. There is no gross instability. There are no rashes, ulcerations or lesions. Strength and tone are normal.  Left Lower Extremity: Examination of the left lower extremity does not show any tenderness, deformity or injury. Range of motion is unremarkable. There is no gross instability. There are no rashes, ulcerations or lesions. Strength and tone are normal.  Examination of the bilateral hip reveals intact skin. The patient demonstrates full painless range of motion with regards to flexion, abduction, internal and external rotation. There is not tenderness about the greater trochanter. There is a negative straight leg raise against resistance. Strength is 5/5 thorough out all planes.         Diagnostic Test Findings: Updated bilateral knee AP and PA weightbearing sunrise and lateral films were reviewed from 12/4/2019 and does show advanced bilateral knee medial compartment osteoarthritis with effective bone-on-bone changes to the medial compartments. At least moderate right greater than left patellofemoral arthropathy with spurring was noted. Assessment & Plan:    Encounter Diagnoses   Name Primary? Primary osteoarthritis of right knee Yes    Primary osteoarthritis of left knee     Chronic pain of both knees        Orders Placed This Encounter   Procedures    20610 - [place] DRAIN/INJECT LARGE JOINT/BURSA         Treatment Plan:  Treatment options were discussed with Juan Wilkes. We did once again review his updated plain films and his exam findings. He does have underlying significant medial compartment bilateral knee osteoarthritis and clinically at this time he has become a little bit more symptomatic after playing 18 holes 2 separate days in a row combined with yard work. Rates pain about a 5 out of 10 on the right and about a 1-2 out of 10 on the left. He did not notice a substantial improvement with his Zilretta trial on 8/1/2022. After discussion of pros and cons of viscosupplementation, he did receive his first injection of Euflexxa intra-articularly to his knees after sterile prep. This was performed using the standard prefilled 2 cc syringe. He will be seen back each in the next 2 weeks to finish up his Euflexxa series which she is done reasonably well with in the past.  We may consider a post Euflexxa series steroid injection as it would put him about 3 months out from his Xarelto trial.  He previously had surgical consultation with Dr. Ailyn Perez in 2018 who encouraged him to continue with conservative treatment. He will continue with his meloxicam 15 mg daily and we did have a discussion once again regarding knee arthroplasty and was given information regarding knee arthroplasty. He will contact us in the interim with questions or concerns we will see him back each of next 2 weeks. This dictation was performed with a verbal recognition program (DRAGON) and it was checked for errors.  It is possible that there are still dictated errors within this office note. If so, please bring any errors to my attention for an addendum. All efforts were made to ensure that this office note is accurate.

## 2022-10-19 ENCOUNTER — OFFICE VISIT (OUTPATIENT)
Dept: ORTHOPEDIC SURGERY | Age: 72
End: 2022-10-19
Payer: MEDICARE

## 2022-10-19 DIAGNOSIS — M17.12 PRIMARY OSTEOARTHRITIS OF LEFT KNEE: ICD-10-CM

## 2022-10-19 DIAGNOSIS — M25.562 CHRONIC PAIN OF BOTH KNEES: ICD-10-CM

## 2022-10-19 DIAGNOSIS — G89.29 CHRONIC PAIN OF BOTH KNEES: ICD-10-CM

## 2022-10-19 DIAGNOSIS — M17.11 PRIMARY OSTEOARTHRITIS OF RIGHT KNEE: Primary | ICD-10-CM

## 2022-10-19 DIAGNOSIS — M25.561 CHRONIC PAIN OF BOTH KNEES: ICD-10-CM

## 2022-10-19 PROCEDURE — 20610 DRAIN/INJ JOINT/BURSA W/O US: CPT | Performed by: FAMILY MEDICINE

## 2022-10-19 RX ORDER — HYALURONATE SODIUM 10 MG/ML
40 SYRINGE (ML) INTRAARTICULAR ONCE
Status: COMPLETED | OUTPATIENT
Start: 2022-10-19 | End: 2022-10-19

## 2022-10-19 RX ADMIN — Medication 40 MG: at 09:20

## 2022-10-19 NOTE — PROGRESS NOTES
CC:  FU Knee Osteoarthritis with Viscosupplementation      Knee Pain (EUFLEXXA #1 B KNEES)      Pao Avendano is a 67 y.o. male who is a very pleasant retired white male who does golf on a near daily basis and is a patient of Dr. Corazon Zelaya who had been followed over the last several years by myself and Dr. 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 He last completed a round of bilateral knee Euflexxa on 12/23/2019 which did help him substantially up until early April 2020 there is no interim history of injury but has been golfing 4 to 5 days/week but it is not raining. Olga Worthington He does relate that while this is helped him in the past his last round of Visco supplementation is not nearly as effective as previous rounds have been. He is continue with his exercise program and does takes his his meloxicam fairly consistently. He does use knee sleeves which he got over-the-counter with golfing but typically when he golfs will come home and have to ice his knees. He is able to walk and perform most of his normal activities but typically will be sore post activity. Distance walking walking on uneven surfaces and stairclimbing can produce pain anywhere from a 2-5 out of 10. It is more achy in nature. He has been strongly considering the possibility of knee arthroplasty but is not having substantial night pain. His concern is that he is not getting \"any younger\" and does wish to continue with his normal golfing patterns. Denies locking catching or instability symptoms. He does have crepitation and popping. He does have episodic swelling. No true instability symptoms noted. With I saw Laura Ortega in the office on 8/1/2022 when we attempted Pamalee Pulling to his knees.   He does not believe this worked any more effectively than steroid injections and is having pain anteriorly and medially more so on the right than left. Denies locking or catching is trying to work on her exercises as utilize his sleeves and will have soreness if he attempts to golf multiple days in a row. He is thankfully not having a great deal of rest or night pain. He still has discomfort with positional changes and stair climbing as well as distance walking. He last completed viscosupplementation to his knees on 3/28/2022 and still is hopeful to put off knee arthroplasty although he has been thinking a lot more about this particular to his right knee. We last saw Lloyd Roth in the office on 1/12/2022 and was started on viscosupplementation once again for his knees. He has noticed moderate improvement overall but has not been golfing as much secondary to the weather being cold outside. He is working on his exercise and stretching program as he can. He seems to be having less discomfort with distance walking positional changes and going up and down stairs. He is here today for second Euflexxa injection has done reasonably well with this. He is open to put off knee arthroplasty but has been considering this more on the right knee initially if treatment becomes less effective. PE: no substantial change in exam.    Assessment:  Knee Osteoarthritis with viscosupplementation      PROCEDURE NOTE:    PRE-PROCEDURE DIAGNOSIS: DJD knee    POST-PROCEDURE DIAGNOSIS: DJD knee    Injection #2 of Euflexxa. PROCEDURE:  With the patient's permission, his bilaterally knee was prepped in standard sterile fashion with Betadine and Alcohol and the prefilled 2cc injection of Euflexxa was injected intra-articularly into the bilaterally knee via a superolateral approach without difficulty. The patient tolerated this well without difficulty. A band-aid was applied. POST-PROCEDURE INSTRUCTIONS GIVEN TO PATIENT: The patient was advised to ice the knee for 15-20 minutes to relieve any injection site related pain. FOLLOW-UP: as directed.   He will continue his meloxicam 15 mg daily use of his knee sleeve and his home-based exercise program.  We will see him back next week to finish up his Euflexxa injections. We did discuss potential timing of knee arthroplasty and he certainly would not do it prior to golf season but may consider this next winter.   He will contact us in the interim with questions or concerns

## 2022-10-26 ENCOUNTER — OFFICE VISIT (OUTPATIENT)
Dept: ORTHOPEDIC SURGERY | Age: 72
End: 2022-10-26
Payer: MEDICARE

## 2022-10-26 DIAGNOSIS — M17.11 PRIMARY OSTEOARTHRITIS OF RIGHT KNEE: Primary | ICD-10-CM

## 2022-10-26 DIAGNOSIS — M25.562 CHRONIC PAIN OF BOTH KNEES: ICD-10-CM

## 2022-10-26 DIAGNOSIS — G89.29 CHRONIC PAIN OF BOTH KNEES: ICD-10-CM

## 2022-10-26 DIAGNOSIS — M25.561 CHRONIC PAIN OF BOTH KNEES: ICD-10-CM

## 2022-10-26 DIAGNOSIS — M17.12 PRIMARY OSTEOARTHRITIS OF LEFT KNEE: ICD-10-CM

## 2022-10-26 PROCEDURE — 99999 PR OFFICE/OUTPT VISIT,PROCEDURE ONLY: CPT | Performed by: FAMILY MEDICINE

## 2022-10-26 PROCEDURE — 20610 DRAIN/INJ JOINT/BURSA W/O US: CPT | Performed by: FAMILY MEDICINE

## 2022-10-26 RX ORDER — HYALURONATE SODIUM 10 MG/ML
40 SYRINGE (ML) INTRAARTICULAR ONCE
Status: COMPLETED | OUTPATIENT
Start: 2022-10-26 | End: 2022-10-26

## 2022-10-26 RX ADMIN — Medication 40 MG: at 09:03

## 2022-10-26 NOTE — PROGRESS NOTES
CC:  FU Knee Osteoarthritis with Viscosupplementation      Knee Pain (EUFLEXXA #1 B KNEES)      Billie Michele is a 67 y.o. male who is a very pleasant retired white male who does golf on a near daily basis and is a patient of Dr. Vignesh Medina who had been followed over the last several years by myself and Dr. General Forrester for underlying significant medial compartment osteoarthritis with patellofemoral arthropathy who is being seen back today for recurrence of his knee pain. He last completed a round of bilateral knee Euflexxa on 12/23/2019 which did help him substantially up until early April 2020 there is no interim history of injury but has been golfing 4 to 5 days/week but it is not raining. Theresa العلي He does relate that while this is helped him in the past his last round of Visco supplementation is not nearly as effective as previous rounds have been. He is continue with his exercise program and does takes his his meloxicam fairly consistently. He does use knee sleeves which he got over-the-counter with golfing but typically when he golfs will come home and have to ice his knees. He is able to walk and perform most of his normal activities but typically will be sore post activity. Distance walking walking on uneven surfaces and stairclimbing can produce pain anywhere from a 2-5 out of 10. It is more achy in nature. He has been strongly considering the possibility of knee arthroplasty but is not having substantial night pain. His concern is that he is not getting \"any younger\" and does wish to continue with his normal golfing patterns. Denies locking catching or instability symptoms. He does have crepitation and popping. He does have episodic swelling. No true instability symptoms noted. With I saw Jin Azul in the office on 8/1/2022 when we attempted Gratz Wade to his knees.   He does not believe this worked any more effectively than steroid injections and is having pain anteriorly and medially more so on the right than left. Denies locking or catching is trying to work on her exercises as utilize his sleeves and will have soreness if he attempts to golf multiple days in a row. He is thankfully not having a great deal of rest or night pain. He still has discomfort with positional changes and stair climbing as well as distance walking. He last completed viscosupplementation to his knees on 3/28/2022 and still is hopeful to put off knee arthroplasty although he has been thinking a lot more about this particular to his right knee. We last saw Jean-Pierre Snyder in the office on 1/12/2022 and was started on viscosupplementation once again for his knees. He has noticed moderate improvement overall but has not been golfing as much secondary to the weather being cold outside. He is working on his exercise and stretching program as he can. He seems to be having less discomfort with distance walking positional changes and going up and down stairs. He is here today for second Euflexxa injection has done reasonably well with this. He is open to put off knee arthroplasty but has been considering this more on the right knee initially if treatment becomes less effective. Saw Jean-Peirre Snyder in the office on 10/19/2022 and was continued on viscosupplementation with Euflexxa for his underlying knee osteoarthritis and patellofemoral neuropathy. He is noted to moderate improvement and does continue to golf and will likely golf throughout the winter weather permitting. He seems to be having less discomfort with positional changes and distance walking as well as stair climbing. Today for his final Euflexxa injection is reasonably well with this and is hoping to put off knee replacement as long as he can.   He does think that treatment has become a little bit less helpful on the right that has been in the past.      PE: no substantial change in exam.    Assessment:  Knee Osteoarthritis with viscosupplementation      PROCEDURE NOTE:    PRE-PROCEDURE DIAGNOSIS: DJD knee    POST-PROCEDURE DIAGNOSIS: DJD knee    Injection #3 of Euflexxa. PROCEDURE:  With the patient's permission, his bilaterally knee was prepped in standard sterile fashion with Betadine and Alcohol and the prefilled 2cc injection of Euflexxa was injected intra-articularly into the bilaterally knee via a superolateral approach without difficulty. The patient tolerated this well without difficulty. A band-aid was applied. POST-PROCEDURE INSTRUCTIONS GIVEN TO PATIENT: The patient was advised to ice the knee for 15-20 minutes to relieve any injection site related pain. FOLLOW-UP: as directed. He will continue his meloxicam 15 mg daily use of his knee sleeve and his home-based exercise program.  He is aware that it may take 8 weeks to see maximum efficacy although interim steroid injections can certainly be a possibility. We had tried [place] in early August 2022 but he does not believe it lasted or was any more effective than review his routine steroid injections. He is not yet ready to pursue knee arthroplasty at this time. Icing and activity modification use of his knee brace recommended. He will contact us in the interim with questions or concerns.

## 2022-12-12 ENCOUNTER — OFFICE VISIT (OUTPATIENT)
Dept: ORTHOPEDIC SURGERY | Age: 72
End: 2022-12-12

## 2022-12-12 VITALS — HEIGHT: 70 IN | WEIGHT: 220 LBS | BODY MASS INDEX: 31.5 KG/M2

## 2022-12-12 DIAGNOSIS — M17.11 PRIMARY OSTEOARTHRITIS OF RIGHT KNEE: Primary | ICD-10-CM

## 2022-12-12 DIAGNOSIS — G89.29 CHRONIC PAIN OF BOTH KNEES: ICD-10-CM

## 2022-12-12 DIAGNOSIS — M25.561 CHRONIC PAIN OF BOTH KNEES: ICD-10-CM

## 2022-12-12 DIAGNOSIS — M17.12 PRIMARY OSTEOARTHRITIS OF LEFT KNEE: ICD-10-CM

## 2022-12-12 DIAGNOSIS — M25.562 CHRONIC PAIN OF BOTH KNEES: ICD-10-CM

## 2022-12-12 RX ORDER — LIDOCAINE HYDROCHLORIDE 10 MG/ML
2 INJECTION, SOLUTION INFILTRATION; PERINEURAL ONCE
Status: COMPLETED | OUTPATIENT
Start: 2022-12-12 | End: 2022-12-12

## 2022-12-12 RX ORDER — ETODOLAC 400 MG/1
400 TABLET, FILM COATED ORAL 2 TIMES DAILY
Qty: 60 TABLET | Refills: 3 | Status: SHIPPED
Start: 2022-12-12 | End: 2022-12-12 | Stop reason: CLARIF

## 2022-12-12 RX ORDER — BUPIVACAINE HYDROCHLORIDE 2.5 MG/ML
4 INJECTION, SOLUTION INFILTRATION; PERINEURAL ONCE
Status: COMPLETED | OUTPATIENT
Start: 2022-12-12 | End: 2022-12-12

## 2022-12-12 RX ORDER — BETAMETHASONE SODIUM PHOSPHATE AND BETAMETHASONE ACETATE 3; 3 MG/ML; MG/ML
24 INJECTION, SUSPENSION INTRA-ARTICULAR; INTRALESIONAL; INTRAMUSCULAR; SOFT TISSUE ONCE
Status: COMPLETED | OUTPATIENT
Start: 2022-12-12 | End: 2022-12-12

## 2022-12-12 RX ORDER — ETODOLAC 400 MG/1
400 TABLET, FILM COATED ORAL 2 TIMES DAILY
Qty: 180 TABLET | Refills: 1 | Status: SHIPPED | OUTPATIENT
Start: 2022-12-12 | End: 2023-12-12

## 2022-12-12 RX ADMIN — BUPIVACAINE HYDROCHLORIDE 10 MG: 2.5 INJECTION, SOLUTION INFILTRATION; PERINEURAL at 10:11

## 2022-12-12 RX ADMIN — BETAMETHASONE SODIUM PHOSPHATE AND BETAMETHASONE ACETATE 24 MG: 3; 3 INJECTION, SUSPENSION INTRA-ARTICULAR; INTRALESIONAL; INTRAMUSCULAR; SOFT TISSUE at 10:10

## 2022-12-12 RX ADMIN — LIDOCAINE HYDROCHLORIDE 2 ML: 10 INJECTION, SOLUTION INFILTRATION; PERINEURAL at 10:11

## 2022-12-12 NOTE — PROGRESS NOTES
Chief Complaint  Knee Pain (CK CHAYO KNEES)      Jose Roberto Huizar is a 67 y.o. male who is a very pleasant retired white male who does golf on a near daily basis and is a patient of Dr. Martha Plasencia who had been followed over the last several years by myself and Dr. Aurea Bond for underlying significant medial compartment osteoarthritis with patellofemoral arthropathy who is being seen back today for recurrence of his knee pain. He last completed a round of bilateral knee Euflexxa on 3/28/2022 which did help him substantially up until early to mid May 2022 there is no interim history of injury but has been golfing 4 to 5 days/week but it is not raining. Ria Melgar He does relate that while this is helped him in the past his last round of Visco supplementation is not nearly as effective as previous rounds have been. He is continue with his exercise program and does takes his his meloxicam fairly consistently. He does use knee sleeves which he got over-the-counter with golfing but typically when he golfs will come home and have to ice his knees. He is able to walk and perform most of his normal activities but typically will be sore post activity. Distance walking walking on uneven surfaces and stairclimbing can produce pain anywhere from a 2-5 out of 10. It is more achy in nature. He has been strongly considering the possibility of knee arthroplasty but is not having substantial night pain. His concern is that he is not getting \"any younger\" and does wish to continue with his normal golfing patterns. Denies locking catching or instability symptoms. He does have crepitation and popping. He does have episodic swelling. No true instability symptoms noted. We last saw Monty Peterson in the office on 3/28/2022 when we finished up his viscosupplementation to his knees bilaterally with Euflexxa.   He was doing reasonably well but has noticed over the last couple weeks increasing pain after playing 2 consecutive days of 18 holes along with yard work. He does not recall a specific injury or fall but his right knee in particular is been bothering him substantially. He is more achy in nature but will have sharp pain with positional changes and walking. He does have crepitation and popping but no active locking or catching. He has noticed some mild swelling and has been taking his meloxicam fairly consistently. He would like to avoid knee arthroplasty as long as he can and we did talk to him about potential geniculate nerve ablation. For the most part his pain ranges between 2-4 out of 10 and is not having consistent night pain. We last saw Elias Gunn in the office on 6/8/2022 for his significant bilateral knee osteoarthritis with patellofemoral neuropathy. He has been doing reasonably well but has become a little bit more sore with frequent coughing throughout the summer to the point where he is having about 5 out of 10 pain anteriorly and medially on the right and only about a 1-2 out of 10 on the left. He has noticed that his typical steroid injections have not been as effective and he last completed viscosupplementation to his knees on 3/28/2022. He would like to try Barb Walker. He has been utilizing his knee sleeves and admits he could be a little bit better and performing his home-based exercises. Thankfully is not complaining of a great deal of rest or night pain but distance walking going up and down stairs if he sits too long and subsequent changes positions is still problematic more so on the right. Denies true locking catching or instability symptoms. With I saw Elias Gunn in the office on 10/26/2022 we completed viscosupplementation with Euflexxa for his knees bilaterally. Initially he thought that he was seeing some improvement however overall it really did not help him to any substantial degree and is certainly experiencing worsening disruptions of his quality life.   His major issues with distance walking such as going shopping or if it is cart path only with coughing. He does utilize his brace and is diligent with his home-based exercises continue with his meloxicam but his pain symptoms are certainly limiting his ability to perform his activities particularly golf. He denies true locking or catching and he occasionally has night pain. He does believe he is getting much closer to arthroplasty and did have consultation several years ago with Dr. Nallely Bernal who encouraged him to continue with conservative treatment which has been progressively less effective for her. Attest: I have reviewed and attest the documentation of the HPI documented by my . I will make any changes if necessary. Enc Date: 12/12/2022  Time: 10:16 AM  Provider: Desi Leggett MD        Social History     Tobacco Use    Smoking status: Never    Smokeless tobacco: Never        Review of Systems  Pertinent items are noted in HPI  Review of systems reviewed from Patient History Form dated on 12/4/2019 and available in the patient's chart under the Media tab. Vital Signs     Ht 5' 10\" (1.778 m)   Wt 220 lb (99.8 kg)   BMI 31.57 kg/m²       General Exam:   Constitutional: Patient is adequately groomed with no evidence of malnutrition  DTRs: Deep tendon reflexes are intact  Mental Status: The patient is oriented to time, place and person. The patient's mood and affect are appropriate. Lymphatic: The lymphatic examination bilaterally reveals all areas to be without enlargement or induration. Vascular: Examination reveals no swelling or calf tenderness. Peripheral pulses are palpable and 2+. Neurological: The patient has good coordination. There is no weakness or sensory deficit. Knee Examination  Inspection:  There is no high-grade deformity. Does have some patellofemoral crepitation and trace knee joint effusions.      Palpation:  He does have more mild to moderate tenderness over the medial and lateral patellofemoral facet on the right more so than left He does have a mildly moderately positive grind test on the right only. Some pain with medial joint line palpation but no high-grade meniscal clicks or so on the right. Rang of Motion:  He is stiff with terminal 10° of extension with flexion to about 110 bilaterally. Hamstrings are mildly tight as is his right IT band. Strength:  4+ out of 5 with knee flexion and extension. Special Tests:  Mild to moderate pain with patellar grind testing in the right. Mild discomfort with crepitation with medial Scooter's on the right. No high-grade clicks. No high-grade instability. Hamstrings and IT band particularly on the right is tight. No palpable Baker's cyst or evidence of instability. Strength testing appears to relatively benign. Skin: There are no rashes, ulcerations or lesions. Distal neurovascular exam is intact. Gait: Fluid smooth gait     Reflex symmetrically preserved     Additional Comments:      Additional Examinations:  Right Lower Extremity: Examination of the right lower extremity does not show any tenderness, deformity or injury. Range of motion is unremarkable. There is no gross instability. There are no rashes, ulcerations or lesions. Strength and tone are normal.  Left Lower Extremity: Examination of the left lower extremity does not show any tenderness, deformity or injury. Range of motion is unremarkable. There is no gross instability. There are no rashes, ulcerations or lesions. Strength and tone are normal.  Examination of the bilateral hip reveals intact skin. The patient demonstrates full painless range of motion with regards to flexion, abduction, internal and external rotation. There is not tenderness about the greater trochanter. There is a negative straight leg raise against resistance. Strength is 5/5 thorough out all planes.         Diagnostic Test Findings: Updated bilateral knee AP and PA weightbearing sunrise and lateral films were reviewed from 12/4/2019 and does show advanced bilateral knee medial compartment osteoarthritis with effective bone-on-bone changes to the medial compartments. At least moderate right greater than left patellofemoral arthropathy with spurring was noted. Assessment & Plan:    Encounter Diagnoses   Name Primary? Primary osteoarthritis of right knee Yes    Primary osteoarthritis of left knee     Chronic pain of both knees        Orders Placed This Encounter   Procedures    ME ARTHROCENTESIS ASPIR&/INJ MAJOR JT/BURSA W/O US         Treatment Plan:  Treatment options were discussed with Rakan Taylor. We did once again review his last set of plain films and his exam findings. He does have underlying significant medial compartment bilateral knee osteoarthritis with patellofemoral arthropathy. Once again he is done reasonably well with conservative treatment over the last several years but believes that it is becoming progressively less effective. His most recent round of viscosupplementation concluded on 10/26/2022 and has not helped him at all. We even tried Chas Aj in August 2022 without positive impact. He states that while he does have pain it is more of a functional limitation which has been bothering her more. He does do his exercise program on a regular basis and we will try changing him from meloxicam to Lodine 400 mg twice daily but I would like for him to sit down with Dr. Isabel Danielle once again. He will certainly need updated x-rays when he sees him and we did give him information after a very long discussion regarding knee replacement surgery and the importance of postsurgical physical therapy which she is willing to engage in aggressively. Icing and activity modification was discussed and we did inject his knees to get him through the holidays bilaterally using 2 cc of Celestone, 2 cc of Marcaine, 1 cc of Xylocaine.   He is aware that he will need to wait at least 90 days prior to pursuing any type of knee arthroplasty given that he got his shots today. He will contact us in the interim with questions or concerns a referral was placed to Dr. Fatimah Clark. He will contact us in the interim with questions or concerns. This dictation was performed with a verbal recognition program (DRAGON) and it was checked for errors. It is possible that there are still dictated errors within this office note. If so, please bring any errors to my attention for an addendum. All efforts were made to ensure that this office note is accurate.

## 2023-01-20 LAB
ALBUMIN SERPL-MCNC: 4.2 GM/DL (ref 3.2–4.6)
ANION GAP SERPL CALCULATED.3IONS-SCNC: 11 MMOL/L (ref 7–16)
APTT: 24.6 SECOND(S) (ref 27.9–38.7)
BASOPHILS ABSOLUTE: 0.1 X10(3)/MCL (ref 0–0.1)
BASOPHILS RELATIVE PERCENT: 0.6 %
BILIRUBIN, URINE: NEGATIVE
BUN BLDV-MCNC: 26 MG/DL (ref 8–23)
CALCIUM SERPL-MCNC: 9.7 MG/DL (ref 8.8–10.4)
CHLORIDE BLD-SCNC: 102 MMOL/L (ref 98–107)
CLARITY: CLEAR
CO2: 24 MMOL/L (ref 22–29)
COLOR: NORMAL
CREAT SERPL-MCNC: 1.63 MG/DL (ref 0.67–1.3)
EOSINOPHILS ABSOLUTE: 0.1 X10(3)/MCL (ref 0–0.5)
EOSINOPHILS RELATIVE PERCENT: 1.2 %
ESTIMATED AVERAGE GLUCOSE: 126 MG/DL
GFR, ESTIMATED: 44 ML/MIN/1.73 M2
GLUCOSE BLD-MCNC: 100 MG/DL (ref 82–100)
GLUCOSE URINE: NEGATIVE MG/DL
GRANULOCYTE IMMATURE ABS: 0 X10(3)/MCL (ref 0–0.1)
HBA1C MFR BLD: 6 % (ref 4.2–5.6)
HCT VFR BLD CALC: 40 % (ref 40–51)
HEMOGLOBIN: 13.6 G/DL (ref 13.7–17.5)
IMMATURE GRANULOCYTES: 0.3 %
INR BLD: 1.07 (RATIO) (ref 0.86–1.12)
KETONES, URINE: NEGATIVE MG/DL
LEUKOCYTE ESTERASE, URINE: NEGATIVE
LYMPHOCYTES ABSOLUTE: 2 X10(3)/MCL (ref 1.2–3.9)
LYMPHOCYTES ABSOLUTE: 23.3 %
MCH RBC QN AUTO: 32.5 PG (ref 26–34)
MCHC RBC AUTO-ENTMCNC: 34 G/DL (ref 30.7–35.5)
MCV RBC AUTO: 95.5 FL (ref 80–100)
MONOCYTES # BLD: 12.3 %
MONOCYTES ABSOLUTE: 1.1 X10(3)/MCL (ref 0.3–0.9)
NEUTROPHILS ABSOLUTE: 5.4 X10(3)/MCL (ref 1.6–6.1)
NITRITE, URINE: NEGATIVE
PDW BLD-RTO: 12.7 %
PH, URINE: 6 PH (ref 5–8)
PLATELET # BLD: 259 X10(3)/MCL (ref 155–369)
PMV BLD AUTO: 11.1 FL (ref 8.8–12.5)
POTASSIUM SERPL-SCNC: 4.3 MMOL/L (ref 3.5–5)
PROTEIN UA: NEGATIVE MG/DL
PROTHROMBIN TIME: 12.6 SECOND(S) (ref 10–13.1)
RBC # BLD: 4.19 X10(6)/MCL (ref 4.6–6.1)
SEGMENTED NEUTROPHILS RELATIVE PERCENT: 62.3 %
SODIUM BLD-SCNC: 137 MMOL/L (ref 136–145)
SPECIFIC GRAVITY UA: 1.01 NO UNITS (ref 1–1.03)
URINE HGB: NEGATIVE
UROBILINOGEN, URINE: NORMAL MG/DL
WBC # BLD: 8.7 X10(3)/MCL (ref 3.7–10.3)

## 2023-01-24 LAB — ORGANISM ID, BACTERIA: NORMAL

## 2023-08-28 DIAGNOSIS — M17.12 PRIMARY OSTEOARTHRITIS OF LEFT KNEE: ICD-10-CM

## 2023-08-28 DIAGNOSIS — M17.11 PRIMARY OSTEOARTHRITIS OF RIGHT KNEE: ICD-10-CM

## 2023-08-28 DIAGNOSIS — G89.29 CHRONIC PAIN OF BOTH KNEES: ICD-10-CM

## 2023-08-28 DIAGNOSIS — M25.561 CHRONIC PAIN OF BOTH KNEES: ICD-10-CM

## 2023-08-28 DIAGNOSIS — M25.562 CHRONIC PAIN OF BOTH KNEES: ICD-10-CM

## 2023-08-28 RX ORDER — ETODOLAC 400 MG/1
TABLET, FILM COATED ORAL
Qty: 180 TABLET | Refills: 0 | Status: SHIPPED | OUTPATIENT
Start: 2023-08-28

## 2023-11-27 LAB
APTT: 27.2 SECOND(S) (ref 27.9–38.7)
BILIRUBIN URINE: NEGATIVE
BLOOD, URINE: NEGATIVE
CLARITY: CLEAR
COLOR: NORMAL
ERYTHROCYTES URINE: 0 /HPF (ref 0–3)
GLUCOSE URINE: NEGATIVE MG/DL
INR BLD: 1.03 (RATIO) (ref 0.89–1.16)
KETONES, URINE: NEGATIVE MG/DL
LEUKOCYTE ESTERASE, URINE: NEGATIVE
LEUKOCYTES, UA: NORMAL /HPF (ref 0–4)
NITRITE, URINE: NEGATIVE
PH UA: 6 PH (ref 5–8)
PROTEIN UA: NORMAL MG/DL
PROTHROMBIN TIME: 12.1 SECOND(S) (ref 10.5–13.6)
SPECIFIC GRAVITY UA: 1.02 NO UNITS (ref 1–1.03)
UROBILINOGEN, URINE: NORMAL MG/DL

## 2023-11-28 LAB — ORGANISM ID, BACTERIA: NORMAL
